# Patient Record
Sex: FEMALE | Race: OTHER | HISPANIC OR LATINO | URBAN - METROPOLITAN AREA
[De-identification: names, ages, dates, MRNs, and addresses within clinical notes are randomized per-mention and may not be internally consistent; named-entity substitution may affect disease eponyms.]

---

## 2018-01-17 PROBLEM — Z00.00 ENCOUNTER FOR PREVENTIVE HEALTH EXAMINATION: Status: ACTIVE | Noted: 2018-01-17

## 2018-01-18 ENCOUNTER — OUTPATIENT (OUTPATIENT)
Dept: OUTPATIENT SERVICES | Facility: HOSPITAL | Age: 28
LOS: 1 days | End: 2018-01-18
Payer: COMMERCIAL

## 2018-01-18 ENCOUNTER — APPOINTMENT (OUTPATIENT)
Dept: ULTRASOUND IMAGING | Facility: HOSPITAL | Age: 28
End: 2018-01-18
Payer: COMMERCIAL

## 2018-01-18 DIAGNOSIS — N64.4 MASTODYNIA: ICD-10-CM

## 2018-01-18 PROCEDURE — 76641 ULTRASOUND BREAST COMPLETE: CPT

## 2018-01-18 PROCEDURE — 76641 ULTRASOUND BREAST COMPLETE: CPT | Mod: 26

## 2018-02-07 ENCOUNTER — RESULT REVIEW (OUTPATIENT)
Age: 28
End: 2018-02-07

## 2018-02-07 ENCOUNTER — OUTPATIENT (OUTPATIENT)
Dept: OUTPATIENT SERVICES | Facility: HOSPITAL | Age: 28
LOS: 1 days | End: 2018-02-07
Payer: COMMERCIAL

## 2018-02-07 DIAGNOSIS — R92.8 OTHER ABNORMAL AND INCONCLUSIVE FINDINGS ON DIAGNOSTIC IMAGING OF BREAST: ICD-10-CM

## 2018-02-07 DIAGNOSIS — N63.0 UNSPECIFIED LUMP IN UNSPECIFIED BREAST: ICD-10-CM

## 2018-02-07 PROCEDURE — 88305 TISSUE EXAM BY PATHOLOGIST: CPT

## 2018-02-07 PROCEDURE — 88305 TISSUE EXAM BY PATHOLOGIST: CPT | Mod: 26

## 2018-02-07 PROCEDURE — 19083 BX BREAST 1ST LESION US IMAG: CPT

## 2018-02-07 PROCEDURE — 19083 BX BREAST 1ST LESION US IMAG: CPT | Mod: RT

## 2018-02-07 PROCEDURE — A4648: CPT

## 2018-07-02 ENCOUNTER — APPOINTMENT (OUTPATIENT)
Dept: OBGYN | Facility: CLINIC | Age: 28
End: 2018-07-02
Payer: COMMERCIAL

## 2018-07-02 VITALS
DIASTOLIC BLOOD PRESSURE: 80 MMHG | OXYGEN SATURATION: 98 % | SYSTOLIC BLOOD PRESSURE: 130 MMHG | HEIGHT: 64 IN | BODY MASS INDEX: 22.02 KG/M2 | HEART RATE: 62 BPM | WEIGHT: 129 LBS

## 2018-07-02 DIAGNOSIS — Z82.49 FAMILY HISTORY OF ISCHEMIC HEART DISEASE AND OTHER DISEASES OF THE CIRCULATORY SYSTEM: ICD-10-CM

## 2018-07-02 DIAGNOSIS — Z80.0 FAMILY HISTORY OF MALIGNANT NEOPLASM OF DIGESTIVE ORGANS: ICD-10-CM

## 2018-07-02 DIAGNOSIS — J45.909 UNSPECIFIED ASTHMA, UNCOMPLICATED: ICD-10-CM

## 2018-07-02 PROCEDURE — 99203 OFFICE O/P NEW LOW 30 MIN: CPT

## 2018-07-05 LAB
HIV1+2 AB SPEC QL IA.RAPID: NONREACTIVE
HPV HIGH+LOW RISK DNA PNL CVX: NOT DETECTED

## 2018-07-08 LAB
A VAGINAE DNA VAG QL NAA+PROBE: NORMAL
BVAB2 DNA VAG QL NAA+PROBE: NORMAL
C KRUSEI DNA VAG QL NAA+PROBE: NEGATIVE
C TRACH RRNA SPEC QL NAA+PROBE: NEGATIVE
CYTOLOGY CVX/VAG DOC THIN PREP: NORMAL
HBV SURFACE AG SER QL: NONREACTIVE
HSV 1+2 IGG SER IA-IMP: NEGATIVE
HSV 1+2 IGG SER IA-IMP: POSITIVE
HSV1 IGG SER QL: 38.3 INDEX
HSV2 IGG SER QL: 0.12 INDEX
MEGA1 DNA VAG QL NAA+PROBE: NORMAL
N GONORRHOEA RRNA SPEC QL NAA+PROBE: NEGATIVE
T PALLIDUM AB SER QL IA: NEGATIVE
T VAGINALIS RRNA SPEC QL NAA+PROBE: NEGATIVE

## 2018-08-06 ENCOUNTER — APPOINTMENT (OUTPATIENT)
Dept: OBGYN | Facility: CLINIC | Age: 28
End: 2018-08-06
Payer: COMMERCIAL

## 2018-08-06 VITALS
SYSTOLIC BLOOD PRESSURE: 100 MMHG | HEART RATE: 95 BPM | BODY MASS INDEX: 22.2 KG/M2 | OXYGEN SATURATION: 98 % | DIASTOLIC BLOOD PRESSURE: 70 MMHG | WEIGHT: 130 LBS | HEIGHT: 64 IN

## 2018-08-06 DIAGNOSIS — Z86.018 PERSONAL HISTORY OF OTHER BENIGN NEOPLASM: ICD-10-CM

## 2018-08-06 PROCEDURE — 99213 OFFICE O/P EST LOW 20 MIN: CPT

## 2019-03-21 ENCOUNTER — APPOINTMENT (OUTPATIENT)
Dept: OBGYN | Facility: CLINIC | Age: 29
End: 2019-03-21
Payer: COMMERCIAL

## 2019-03-21 VITALS
OXYGEN SATURATION: 98 % | BODY MASS INDEX: 23.86 KG/M2 | DIASTOLIC BLOOD PRESSURE: 80 MMHG | RESPIRATION RATE: 16 BRPM | WEIGHT: 139 LBS | SYSTOLIC BLOOD PRESSURE: 118 MMHG | HEART RATE: 66 BPM

## 2019-03-21 DIAGNOSIS — R82.90 UNSPECIFIED ABNORMAL FINDINGS IN URINE: ICD-10-CM

## 2019-03-21 PROCEDURE — 99213 OFFICE O/P EST LOW 20 MIN: CPT

## 2019-03-21 NOTE — PHYSICAL EXAM
[Mass] : no breast mass [Nipple Discharge] : no nipple discharge [Axillary LAD] : no axillary lymphadenopathy [Examination Of The Breasts] : a normal appearance [No Discharge] : no discharge [Tenderness Of The Right Breast] : tenderness [Tenderness Of The Left Breast] : no tenderness [No Masses] : no breast masses were palpable [Axillary Lymph Nodes Enlarged Bilaterally] : no enlarged nodes [Tender] : non tender [Distended] : not distended [H/Smegaly] : no hepatosplenomegaly [Oriented x3] : oriented to person, place, and time [Depressed Mood] : not depressed [Flat Affect] : affect not flat [No Lesions] : no genitalia lesions [Vulvar Atrophy] : no vulvar atrophy [Vulvitis] : no vulvitis [Labia Majora Erythema] : no erythema of the labia majora [Labia Minora Erythema] : no erythema of the labia minora [Labia Majora] : labia major [Labia Minora] : labia minora [Normal] : clitoris [Scant] : scant [Brown] : brown [No Bleeding] : there was no active vaginal bleeding [Discharge] : had no discharge [Erosion] : had no erosions [Motion Tenderness] : there was no cervical motion tenderness [Soft] :  the cervix was soft [Normal Position] : in a normal position [Tenderness] : nontender [Enlarged ___ wks] : not enlarged [Mass ___ cm] : no uterine mass was palpated [Uterine Adnexae] : were not tender and not enlarged [Adnexa Tenderness] : were not tender [Ovarian Mass (___ Cm)] : there were no adnexal masses

## 2019-03-21 NOTE — CHIEF COMPLAINT
[Urgent Visit] : Urgent Visit [FreeTextEntry1] : Urine odor for past 6 months LMP 3/16/19 Not sexually active Denies vaginal Sx

## 2019-03-24 LAB
BACTERIA UR CULT: NORMAL
BILIRUB UR QL STRIP: NORMAL
CANDIDA VAG CYTO: NOT DETECTED
CLARITY UR: CLEAR
COLLECTION METHOD: NORMAL
G VAGINALIS+PREV SP MTYP VAG QL MICRO: NOT DETECTED
GLUCOSE UR-MCNC: NORMAL
HCG UR QL: 0.2 EU/DL
HGB UR QL STRIP.AUTO: NORMAL
KETONES UR-MCNC: NORMAL
LEUKOCYTE ESTERASE UR QL STRIP: NORMAL
NITRITE UR QL STRIP: NORMAL
PH UR STRIP: 7.5
PROT UR STRIP-MCNC: NORMAL
SP GR UR STRIP: 1.01
T VAGINALIS VAG QL WET PREP: NOT DETECTED

## 2019-04-04 ENCOUNTER — TRANSCRIPTION ENCOUNTER (OUTPATIENT)
Age: 29
End: 2019-04-04

## 2019-04-29 ENCOUNTER — FORM ENCOUNTER (OUTPATIENT)
Age: 29
End: 2019-04-29

## 2019-04-30 ENCOUNTER — OUTPATIENT (OUTPATIENT)
Dept: OUTPATIENT SERVICES | Facility: HOSPITAL | Age: 29
LOS: 1 days | End: 2019-04-30
Payer: COMMERCIAL

## 2019-04-30 ENCOUNTER — APPOINTMENT (OUTPATIENT)
Dept: ULTRASOUND IMAGING | Facility: HOSPITAL | Age: 29
End: 2019-04-30

## 2019-04-30 DIAGNOSIS — Z00.8 ENCOUNTER FOR OTHER GENERAL EXAMINATION: ICD-10-CM

## 2019-04-30 PROCEDURE — 76641 ULTRASOUND BREAST COMPLETE: CPT

## 2019-04-30 PROCEDURE — 76641 ULTRASOUND BREAST COMPLETE: CPT | Mod: 26

## 2020-08-07 ENCOUNTER — OUTPATIENT (OUTPATIENT)
Dept: OUTPATIENT SERVICES | Facility: HOSPITAL | Age: 30
LOS: 1 days | End: 2020-08-07
Payer: COMMERCIAL

## 2020-08-07 ENCOUNTER — APPOINTMENT (OUTPATIENT)
Dept: ULTRASOUND IMAGING | Facility: HOSPITAL | Age: 30
End: 2020-08-07
Payer: COMMERCIAL

## 2020-08-07 DIAGNOSIS — Z00.8 ENCOUNTER FOR OTHER GENERAL EXAMINATION: ICD-10-CM

## 2020-08-07 PROCEDURE — 76536 US EXAM OF HEAD AND NECK: CPT | Mod: 26

## 2020-08-07 PROCEDURE — 76536 US EXAM OF HEAD AND NECK: CPT

## 2020-08-15 ENCOUNTER — APPOINTMENT (OUTPATIENT)
Dept: ULTRASOUND IMAGING | Facility: HOSPITAL | Age: 30
End: 2020-08-15
Payer: COMMERCIAL

## 2020-08-15 ENCOUNTER — OUTPATIENT (OUTPATIENT)
Dept: OUTPATIENT SERVICES | Facility: HOSPITAL | Age: 30
LOS: 1 days | End: 2020-08-15
Payer: COMMERCIAL

## 2020-08-15 DIAGNOSIS — Z00.8 ENCOUNTER FOR OTHER GENERAL EXAMINATION: ICD-10-CM

## 2020-08-15 PROCEDURE — 76700 US EXAM ABDOM COMPLETE: CPT

## 2020-08-15 PROCEDURE — 76700 US EXAM ABDOM COMPLETE: CPT | Mod: 26

## 2020-08-19 ENCOUNTER — APPOINTMENT (OUTPATIENT)
Dept: ULTRASOUND IMAGING | Facility: HOSPITAL | Age: 30
End: 2020-08-19

## 2020-09-03 ENCOUNTER — OUTPATIENT (OUTPATIENT)
Dept: OUTPATIENT SERVICES | Facility: HOSPITAL | Age: 30
LOS: 1 days | End: 2020-09-03
Payer: COMMERCIAL

## 2020-09-03 ENCOUNTER — APPOINTMENT (OUTPATIENT)
Dept: ULTRASOUND IMAGING | Facility: HOSPITAL | Age: 30
End: 2020-09-03
Payer: COMMERCIAL

## 2020-09-03 DIAGNOSIS — Z00.8 ENCOUNTER FOR OTHER GENERAL EXAMINATION: ICD-10-CM

## 2020-09-03 PROCEDURE — 76641 ULTRASOUND BREAST COMPLETE: CPT | Mod: 26,50

## 2020-09-03 PROCEDURE — 76641 ULTRASOUND BREAST COMPLETE: CPT

## 2020-12-02 ENCOUNTER — NON-APPOINTMENT (OUTPATIENT)
Age: 30
End: 2020-12-02

## 2021-01-28 ENCOUNTER — APPOINTMENT (OUTPATIENT)
Dept: OBGYN | Facility: CLINIC | Age: 31
End: 2021-01-28
Payer: COMMERCIAL

## 2021-01-28 VITALS
OXYGEN SATURATION: 98 % | SYSTOLIC BLOOD PRESSURE: 120 MMHG | TEMPERATURE: 97 F | HEART RATE: 65 BPM | HEIGHT: 64 IN | DIASTOLIC BLOOD PRESSURE: 70 MMHG | BODY MASS INDEX: 22.88 KG/M2 | WEIGHT: 134 LBS

## 2021-01-28 DIAGNOSIS — N63.20 UNSPECIFIED LUMP IN THE LEFT BREAST, UNSPECIFIED QUADRANT: ICD-10-CM

## 2021-01-28 DIAGNOSIS — N64.4 MASTODYNIA: ICD-10-CM

## 2021-01-28 PROCEDURE — 99072 ADDL SUPL MATRL&STAF TM PHE: CPT

## 2021-01-28 PROCEDURE — 99395 PREV VISIT EST AGE 18-39: CPT

## 2021-01-28 PROCEDURE — 99212 OFFICE O/P EST SF 10 MIN: CPT | Mod: 25

## 2021-01-28 NOTE — PHYSICAL EXAM
[Normal] : normal [Examination Of The Breasts] : a normal appearance [___cm] : a ~M [unfilled] ~Ucm superior lateral quadrant mass was palpated [Soft] : soft [Mobile] : mobile [Tender] : tender [1:00] : in the 1:00 position

## 2021-01-28 NOTE — COUNSELING
[Nutrition/ Exercise/ Weight Management] : nutrition, exercise, weight management [Breast Self Exam] : breast self exam [Body Image] : body image

## 2021-01-29 NOTE — HISTORY OF PRESENT ILLNESS
[Regular Cycle Intervals] : periods have been regular [Previously active] : previously active [PGHxTotal] : 0 [FreeTextEntry1] : 1/18/21

## 2021-01-31 LAB — HPV HIGH+LOW RISK DNA PNL CVX: NOT DETECTED

## 2021-02-06 LAB — CYTOLOGY CVX/VAG DOC THIN PREP: NORMAL

## 2021-03-03 ENCOUNTER — RESULT REVIEW (OUTPATIENT)
Age: 31
End: 2021-03-03

## 2021-03-03 ENCOUNTER — NON-APPOINTMENT (OUTPATIENT)
Age: 31
End: 2021-03-03

## 2021-03-03 ENCOUNTER — APPOINTMENT (OUTPATIENT)
Dept: ULTRASOUND IMAGING | Facility: HOSPITAL | Age: 31
End: 2021-03-03
Payer: COMMERCIAL

## 2021-03-03 ENCOUNTER — OUTPATIENT (OUTPATIENT)
Dept: OUTPATIENT SERVICES | Facility: HOSPITAL | Age: 31
LOS: 1 days | End: 2021-03-03
Payer: COMMERCIAL

## 2021-03-03 DIAGNOSIS — Z00.8 ENCOUNTER FOR OTHER GENERAL EXAMINATION: ICD-10-CM

## 2021-03-03 DIAGNOSIS — N64.4 MASTODYNIA: ICD-10-CM

## 2021-03-03 PROCEDURE — 76642 ULTRASOUND BREAST LIMITED: CPT

## 2021-03-03 PROCEDURE — 76642 ULTRASOUND BREAST LIMITED: CPT | Mod: 26,RT

## 2021-03-19 ENCOUNTER — TRANSCRIPTION ENCOUNTER (OUTPATIENT)
Age: 31
End: 2021-03-19

## 2021-05-20 ENCOUNTER — APPOINTMENT (OUTPATIENT)
Dept: OBGYN | Facility: CLINIC | Age: 31
End: 2021-05-20
Payer: COMMERCIAL

## 2021-05-20 VITALS
HEART RATE: 55 BPM | DIASTOLIC BLOOD PRESSURE: 70 MMHG | SYSTOLIC BLOOD PRESSURE: 120 MMHG | BODY MASS INDEX: 22.88 KG/M2 | WEIGHT: 134 LBS | OXYGEN SATURATION: 98 % | HEIGHT: 64 IN | TEMPERATURE: 97.3 F

## 2021-05-20 DIAGNOSIS — N63.10 UNSPECIFIED LUMP IN THE RIGHT BREAST, UNSPECIFIED QUADRANT: ICD-10-CM

## 2021-05-20 DIAGNOSIS — R92.8 OTHER ABNORMAL AND INCONCLUSIVE FINDINGS ON DIAGNOSTIC IMAGING OF BREAST: ICD-10-CM

## 2021-05-20 PROCEDURE — 99213 OFFICE O/P EST LOW 20 MIN: CPT

## 2021-05-20 PROCEDURE — 99072 ADDL SUPL MATRL&STAF TM PHE: CPT

## 2021-07-06 ENCOUNTER — TRANSCRIPTION ENCOUNTER (OUTPATIENT)
Age: 31
End: 2021-07-06

## 2022-06-02 ENCOUNTER — APPOINTMENT (OUTPATIENT)
Dept: OBGYN | Facility: CLINIC | Age: 32
End: 2022-06-02
Payer: COMMERCIAL

## 2022-06-02 VITALS
BODY MASS INDEX: 22.88 KG/M2 | HEIGHT: 64 IN | OXYGEN SATURATION: 98 % | SYSTOLIC BLOOD PRESSURE: 100 MMHG | HEART RATE: 70 BPM | WEIGHT: 134 LBS | DIASTOLIC BLOOD PRESSURE: 70 MMHG

## 2022-06-02 DIAGNOSIS — Z01.419 ENCOUNTER FOR GYNECOLOGICAL EXAMINATION (GENERAL) (ROUTINE) W/OUT ABNORMAL FINDINGS: ICD-10-CM

## 2022-06-02 DIAGNOSIS — N92.0 EXCESSIVE AND FREQUENT MENSTRUATION WITH REGULAR CYCLE: ICD-10-CM

## 2022-06-02 DIAGNOSIS — Z11.3 ENCOUNTER FOR SCREENING FOR INFECTIONS WITH A PREDOMINANTLY SEXUAL MODE OF TRANSMISSION: ICD-10-CM

## 2022-06-02 PROCEDURE — 99395 PREV VISIT EST AGE 18-39: CPT

## 2022-06-02 NOTE — PHYSICAL EXAM
[Chaperone Present] : A chaperone was present in the examining room during all aspects of the physical examination [FreeTextEntry1] : ENRIQUETA Gunter [Appropriately responsive] : appropriately responsive [Alert] : alert [No Acute Distress] : no acute distress [No Lymphadenopathy] : no lymphadenopathy [Non-tender] : non-tender [Non-distended] : non-distended [No HSM] : No HSM [No Lesions] : no lesions [No Mass] : no mass [Oriented x3] : oriented x3 [Examination Of The Breasts] : a normal appearance [No Discharge] : no discharge [No Masses] : no breast masses were palpable [Labia Majora] : normal [Labia Minora] : normal [No Bleeding] : There was no active vaginal bleeding [Soft] : soft [Normal] : normal [Normal Position] : in a normal position [Tenderness] : nontender [Enlarged ___ wks] : not enlarged [Mass ___ cm] : no uterine mass was palpated [Uterine Adnexae] : normal

## 2022-06-02 NOTE — COUNSELING
[Nutrition/ Exercise/ Weight Management] : nutrition, exercise, weight management [Vitamins/Supplements] : vitamins/supplements [Breast Self Exam] : breast self exam [Preconception Care/ Fertility options] : preconception care, fertility options [FreeTextEntry2] : Menstrual diary

## 2022-06-02 NOTE — HISTORY OF PRESENT ILLNESS
[FreeTextEntry1] : Check up  Had 2 menses in May each last 5 days Usually has regular menses Menses in April was late by one week Got  3/10/22 Did home HCG last Monday  Results negative denies postcoital bleeding\par \par Denies COVID infection  Denies COVID vaccine [N] : Patient does not use contraception [Y] : Patient is sexually active [Monogamous (Male Partner)] : is monogamous with a male partner [LMPDate] : 5/26/22 [PGHxTotal] : 0

## 2022-06-02 NOTE — REVIEW OF SYSTEMS
[Dysuria] : dysuria [Abn Vaginal bleeding] : abnormal vaginal bleeding [Genital Rash/Irritation] : genital rash/irritation [Negative] : Genitourinary

## 2022-06-09 DIAGNOSIS — B96.89 ACUTE VAGINITIS: ICD-10-CM

## 2022-06-09 DIAGNOSIS — N76.0 ACUTE VAGINITIS: ICD-10-CM

## 2022-06-09 LAB
C TRACH RRNA SPEC QL NAA+PROBE: NOT DETECTED
CANDIDA VAG CYTO: NOT DETECTED
CYTOLOGY CVX/VAG DOC THIN PREP: NORMAL
G VAGINALIS+PREV SP MTYP VAG QL MICRO: DETECTED
HCG UR QL: NEGATIVE
HPV HIGH+LOW RISK DNA PNL CVX: NOT DETECTED
N GONORRHOEA RRNA SPEC QL NAA+PROBE: NOT DETECTED
QUALITY CONTROL: YES
SOURCE AMPLIFICATION: NORMAL
T VAGINALIS VAG QL WET PREP: NOT DETECTED

## 2022-07-05 LAB
HBV SURFACE AG SER QL: NONREACTIVE
HIV1+2 AB SPEC QL IA.RAPID: NONREACTIVE
HSV 1+2 IGG SER IA-IMP: NEGATIVE
HSV 1+2 IGG SER IA-IMP: POSITIVE
HSV1 IGG SER QL: 38.2 INDEX
HSV2 IGG SER QL: 0.11 INDEX
T PALLIDUM AB SER QL IA: NEGATIVE

## 2022-11-23 ENCOUNTER — NON-APPOINTMENT (OUTPATIENT)
Age: 32
End: 2022-11-23

## 2022-12-08 ENCOUNTER — APPOINTMENT (OUTPATIENT)
Dept: OBGYN | Facility: CLINIC | Age: 32
End: 2022-12-08

## 2023-01-18 ENCOUNTER — APPOINTMENT (OUTPATIENT)
Dept: ANTEPARTUM | Facility: CLINIC | Age: 33
End: 2023-01-18
Payer: COMMERCIAL

## 2023-01-18 ENCOUNTER — ASOB RESULT (OUTPATIENT)
Age: 33
End: 2023-01-18

## 2023-01-18 DIAGNOSIS — O35.1XX0 MATERNAL CARE FOR (SUSPECTED) CHROMOSOMAL ABNORMALITY IN FETUS, NOT APPLICABLE OR UNSPECIFIED: ICD-10-CM

## 2023-01-18 PROCEDURE — 76813 OB US NUCHAL MEAS 1 GEST: CPT

## 2023-01-18 PROCEDURE — 36415 COLL VENOUS BLD VENIPUNCTURE: CPT

## 2023-01-24 LAB
ADDITIONAL US: NORMAL
COMMENTS: AFP: NORMAL
CRL SCAN TWIN B: NORMAL
CRL SCAN: NORMAL
CROWN RUMP LENGTH TWIN B: NORMAL
CROWN RUMP LENGTH: 66.8 MM
DOWN SYNDROME AGE RISK: NORMAL
DOWN SYNDROME INTERPRETATION: NORMAL
DOWN SYNDROME SCREENING RISK: NORMAL
GEST. AGE ON COLLECTION DATE: 12.9 WEEKS
HCG MOM: 0.76
HCG VALUE: 77.5 IU/ML
MATERNAL AGE AT EDD: 33.6 YR
NOTE: AFP: NORMAL
NT MOM TWIN B: NORMAL
NT TWIN B: NORMAL
NUCHAL TRANSLUCENCY (NT): 1.5 MM
NUCHAL TRANSLUCENCY MOM: 1.11
NUMBER OF FETUSES: 1
PAPP-A MOM: 0.65
PAPP-A VALUE: 818.9 NG/ML
RACE: NORMAL
RESULTS AFP: NORMAL
SONOGRAPHER ID#: NORMAL
SUBMIT PART 2 SAMPLE USING: NORMAL
TEST RESULTS: AFP: NORMAL
TRISOMY 18 AGE RISK: NORMAL
TRISOMY 18 INTERPRETATION: NORMAL
TRISOMY 18 SCREENING RISK: NORMAL
WEIGHT AFP: 133 LBS

## 2023-02-15 ENCOUNTER — APPOINTMENT (OUTPATIENT)
Dept: ANTEPARTUM | Facility: CLINIC | Age: 33
End: 2023-02-15
Payer: COMMERCIAL

## 2023-02-15 PROCEDURE — 36415 COLL VENOUS BLD VENIPUNCTURE: CPT

## 2023-02-21 LAB
ADDITIONAL US: NORMAL
AFP MOM: 0.91
AFP VALUE: 35.3 NG/ML
COLLECTED ON 2: NORMAL
COLLECTED ON: NORMAL
CRL SCAN TWIN B: NORMAL
CRL SCAN: NORMAL
CROWN RUMP LENGTH TWIN B: NORMAL
CROWN RUMP LENGTH: 66.8 MM
DIA MOM: 0.88
DIA VALUE: 142.7 PG/ML
DOWN SYNDROME AGE RISK: NORMAL
DOWN SYNDROME INTERPRETATION: NORMAL
DOWN SYNDROME SCREENING RISK: NORMAL
FIRST TRIMESTER SAMPLE: NORMAL
GEST. AGE ON COLLECTION DATE: 12.9 WEEKS
GESTATIONAL AGE: 16.9 WEEKS
HCG MOM: 1.14
HCG VALUE: 38.7 IU/ML
INSULIN DEP DIABETES: NO
MATERNAL AGE AT EDD: 33.6 YR
NT MOM TWIN B: NORMAL
NT TWIN B: NORMAL
NUCHAL TRANSLUCENCY (NT): 1.5 MM
NUCHAL TRANSLUCENCY MOM: 1.11
NUMBER OF FETUSES: 1
OPEN SPINA BIFIDA: NORMAL
OSB INTERPRETATION: NORMAL
PAPP-A MOM: 0.65
PAPP-A VALUE: 818.9 NG/ML
RACE: NORMAL
SECOND TRIMESTER SAMPLE: NORMAL
SEQUENTIAL 2 COMMENTS: NORMAL
SEQUENTIAL 2 NOTE: NORMAL
SEQUENTIAL 2 RESULTS: NORMAL
SEQUENTIAL 2 TEST RESULTS: NORMAL
SONOGRAPHER ID#: NORMAL
TRISOMY 18 AGE RISK: NORMAL
TRISOMY 18 INTERPRETATION: NORMAL
TRISOMY 18 SCREENING RISK: NORMAL
UE3 MOM: 0.76
UE3 VALUE: 0.89 NG/ML
WEIGHT AFP: 133 LBS
WEIGHT: 139 LBS

## 2023-02-24 ENCOUNTER — NON-APPOINTMENT (OUTPATIENT)
Age: 33
End: 2023-02-24

## 2023-03-15 ENCOUNTER — APPOINTMENT (OUTPATIENT)
Dept: ANTEPARTUM | Facility: CLINIC | Age: 33
End: 2023-03-15
Payer: COMMERCIAL

## 2023-03-15 ENCOUNTER — ASOB RESULT (OUTPATIENT)
Age: 33
End: 2023-03-15

## 2023-03-15 PROCEDURE — 76817 TRANSVAGINAL US OBSTETRIC: CPT | Mod: 59

## 2023-03-15 PROCEDURE — 76811 OB US DETAILED SNGL FETUS: CPT

## 2023-03-15 PROCEDURE — 99212 OFFICE O/P EST SF 10 MIN: CPT | Mod: 24,25

## 2023-03-22 ENCOUNTER — OUTPATIENT (OUTPATIENT)
Dept: OUTPATIENT SERVICES | Age: 33
LOS: 1 days | End: 2023-03-22

## 2023-03-22 ENCOUNTER — APPOINTMENT (OUTPATIENT)
Dept: MRI IMAGING | Facility: HOSPITAL | Age: 33
End: 2023-03-22
Payer: COMMERCIAL

## 2023-03-22 DIAGNOSIS — Q79.0 CONGENITAL DIAPHRAGMATIC HERNIA: ICD-10-CM

## 2023-03-23 ENCOUNTER — APPOINTMENT (OUTPATIENT)
Dept: MATERNAL FETAL MEDICINE | Facility: CLINIC | Age: 33
End: 2023-03-23
Payer: COMMERCIAL

## 2023-03-23 ENCOUNTER — ASOB RESULT (OUTPATIENT)
Age: 33
End: 2023-03-23

## 2023-03-23 ENCOUNTER — APPOINTMENT (OUTPATIENT)
Dept: PEDIATRIC CARDIOLOGY | Facility: CLINIC | Age: 33
End: 2023-03-23
Payer: COMMERCIAL

## 2023-03-23 DIAGNOSIS — Z3A.21 21 WEEKS GESTATION OF PREGNANCY: ICD-10-CM

## 2023-03-23 PROCEDURE — 74712 MRI FETAL SNGL/1ST GESTATION: CPT | Mod: 26

## 2023-03-23 PROCEDURE — 76827 ECHO EXAM OF FETAL HEART: CPT

## 2023-03-23 PROCEDURE — 76821 MIDDLE CEREBRAL ARTERY ECHO: CPT

## 2023-03-23 PROCEDURE — 99212 OFFICE O/P EST SF 10 MIN: CPT | Mod: 95

## 2023-03-23 PROCEDURE — 99204 OFFICE O/P NEW MOD 45 MIN: CPT | Mod: 25

## 2023-03-23 PROCEDURE — 93325 DOPPLER ECHO COLOR FLOW MAPG: CPT | Mod: 59

## 2023-03-23 PROCEDURE — 76820 UMBILICAL ARTERY ECHO: CPT

## 2023-03-23 PROCEDURE — 99202 OFFICE O/P NEW SF 15 MIN: CPT | Mod: 95

## 2023-03-23 PROCEDURE — 76825 ECHO EXAM OF FETAL HEART: CPT

## 2023-03-27 ENCOUNTER — NON-APPOINTMENT (OUTPATIENT)
Age: 33
End: 2023-03-27

## 2023-04-03 ENCOUNTER — NON-APPOINTMENT (OUTPATIENT)
Age: 33
End: 2023-04-03

## 2023-04-18 ENCOUNTER — ASOB RESULT (OUTPATIENT)
Age: 33
End: 2023-04-18

## 2023-04-18 ENCOUNTER — APPOINTMENT (OUTPATIENT)
Dept: ANTEPARTUM | Facility: CLINIC | Age: 33
End: 2023-04-18
Payer: COMMERCIAL

## 2023-04-18 PROCEDURE — 76816 OB US FOLLOW-UP PER FETUS: CPT

## 2023-04-19 ENCOUNTER — APPOINTMENT (OUTPATIENT)
Dept: PEDIATRIC SURGERY | Facility: CLINIC | Age: 33
End: 2023-04-19
Payer: COMMERCIAL

## 2023-04-19 PROCEDURE — 99205 OFFICE O/P NEW HI 60 MIN: CPT | Mod: 95

## 2023-04-21 ENCOUNTER — APPOINTMENT (OUTPATIENT)
Dept: PEDIATRIC CARDIOLOGY | Facility: CLINIC | Age: 33
End: 2023-04-21

## 2023-04-23 NOTE — ASSESSMENT
[FreeTextEntry1] : I had the privilege of meeting with Ms. Gregg today.   She is currently pregnant with a 25 week gestational age fetus, with recently diagnosed with a left sided diaphragmatic abnormality. This was first visualized on prenatal US at 20 weeks when a diaphragmatic hernia was visualized with stomach in the left chest.  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   She had an echocardiogram that was normal without any structural defects noted.   I reviewed these findings today with Ms. Gregg.  \par \par I had a long discussion today with Ms. Gregg, educating her about the differential diagnosis of diaphragmatic abnormalities in the fetus. I explained that the purpose of the visit was to provide her with information about the baby’s condition and prognosis and what she may anticipate for the future. I explained the nature of what an eventration versus CDH is  I spent time discussing the differences between the two. I reviewed with her that often times a CDH may have a sac and it could be difficult to ascertain the difference in utero or even after birth. However, I discussed that it is the effects on lung development, mainly pulmonary hypoplasia and pulmonary hypertension, that will be important in the immediate  period.\par \par I explained that these effects on the heart and lungs are variable, and therefore the prognosis is also variable.  With regards to an eventration, I discussed with her that the possibility of surgical intervention will depend on the baby's symptoms.  If the baby is completely asymptomatic, and a diagnosis of eventration is confirmed, mom understands that surgical intervention may not be warranted.  Should the baby have an eventration; however, have respiratory symptoms, than repair would be recommended.  I discussed this is typically performed via thoracoscopy with an eventration of the diaphragm but with this may require an open procedure should the baby have significant symptoms. \par \par I spent the majority of the time discussing the possibility of a CDH.  I reviewed in some detail the care that these babies receive after birth, and the different modalities that can be utilized to help support the baby after birth, including a breathing tube, special ventilators, NO and ECMO, and the possible complications of the use of these modalities. I also discussed the surgical repair, including both the thoracoscopic and open techniques, including the associated risks. I discussed what we know about the long term outcomes are for babies born with both an eventration or a CDH.  Mom understands that at times, a certain diagnosis can only be made at the time of surgical intervention given the potential difficulties in diagnosis between these two entities.  \par \par Finally, I reviewed what criteria we have available to help predict the baby’s prognosis.  I reviewed with Ms Gregg that the current o/e ratio is  quite reassuring, but she understands this isn't a perfect predictors and we will continue to monitor these numbers and prognostic factors together moving forward. She understands that after the baby's birth, we will have a better understanding of the baby's diagnosis, prognosis, and need for surgery. I reviewed with her that in severe cases, ECMO is a possibility, but in the most severe, ECMO may not be offered and there may be nothing to offer to their baby. She understands that surgery is typically deferred until the baby has improved from a cardiorespiratory standpoint and again in the most severe cases, is not offered.   She understand that the timing of the procedure as well as the postoperative course will depend upon the degree of pulmonary hypoplasia and pulmonary hypertension.\par \par Ms Gregg demonstrates understanding of all that we have discussed. I will continue to monitor the progress throughout the pregnancy. I have answered all of her questions to the best of my ability. She knows to contact me with any further questions or concerns.\par

## 2023-04-23 NOTE — REASON FOR VISIT
[New Patient Prenatal Visit] : a new patient prenatal visit [Patient] : patient [[employer] ] : provided by [employer]   [FreeTextEntry4] : Dr Nicola Cooper [Interpreters_IDNumber] : [id number] [TWNoteComboBox1] : Ukrainian

## 2023-04-23 NOTE — CONSULT LETTER
[Dear  ___] : Dear  [unfilled], [Consult Letter:] : I had the pleasure of evaluating your patient, [unfilled]. [Please see my note below.] : Please see my note below. [Consult Closing:] : Thank you very much for allowing me to participate in the care of this patient.  If you have any questions, please do not hesitate to contact me. [Sincerely,] : Sincerely, [FreeTextEntry2] : Dr Nicola Cooper\par [address] [place] [address], [address]\[place] [address]\par  [FreeTextEntry3] : Jesus Dent MD\par Division of Pediatric, General, Thoracic and Endoscopic Surgery\par [facility]

## 2023-04-23 NOTE — HISTORY OF PRESENT ILLNESS
[Home] : at home, [unfilled] , at the time of the visit. [Medical Office: ([facility])___] : at the medical office located in  [Verbal consent obtained from patient] : the patient, [unfilled] [FreeTextEntry1] : Ms Gregg is a 32 year old female carrying a 25 week gestational age fetus found to have a likely left congenital  diaphragmatic hernia on prenatal imaging.  She is here today via [facility] to further discuss these findings and the plan of care for her baby after birth.

## 2023-04-25 ENCOUNTER — NON-APPOINTMENT (OUTPATIENT)
Age: 33
End: 2023-04-25

## 2023-05-02 ENCOUNTER — NON-APPOINTMENT (OUTPATIENT)
Age: 33
End: 2023-05-02

## 2023-05-04 ENCOUNTER — NON-APPOINTMENT (OUTPATIENT)
Age: 33
End: 2023-05-04

## 2023-05-12 ENCOUNTER — APPOINTMENT (OUTPATIENT)
Dept: PEDIATRIC CARDIOLOGY | Facility: CLINIC | Age: 33
End: 2023-05-12
Payer: COMMERCIAL

## 2023-05-12 DIAGNOSIS — Z3A.28 28 WEEKS GESTATION OF PREGNANCY: ICD-10-CM

## 2023-05-12 DIAGNOSIS — O35.9XX0 MATERNAL CARE FOR (SUSPECTED) FETAL ABNORMALITY AND DAMAGE, UNSPECIFIED, NOT APPLICABLE OR UNSPECIFIED: ICD-10-CM

## 2023-05-12 PROCEDURE — 76826 ECHO EXAM OF FETAL HEART: CPT

## 2023-05-12 PROCEDURE — 76821 MIDDLE CEREBRAL ARTERY ECHO: CPT

## 2023-05-12 PROCEDURE — 99214 OFFICE O/P EST MOD 30 MIN: CPT | Mod: 25

## 2023-05-12 PROCEDURE — 76820 UMBILICAL ARTERY ECHO: CPT

## 2023-05-12 PROCEDURE — 93325 DOPPLER ECHO COLOR FLOW MAPG: CPT | Mod: 59

## 2023-05-12 PROCEDURE — 76828 ECHO EXAM OF FETAL HEART: CPT

## 2023-05-17 ENCOUNTER — ASOB RESULT (OUTPATIENT)
Age: 33
End: 2023-05-17

## 2023-05-17 ENCOUNTER — APPOINTMENT (OUTPATIENT)
Dept: ANTEPARTUM | Facility: CLINIC | Age: 33
End: 2023-05-17
Payer: COMMERCIAL

## 2023-05-17 ENCOUNTER — APPOINTMENT (OUTPATIENT)
Dept: ANTEPARTUM | Facility: CLINIC | Age: 33
End: 2023-05-17

## 2023-05-17 PROCEDURE — 76816 OB US FOLLOW-UP PER FETUS: CPT

## 2023-05-22 ENCOUNTER — APPOINTMENT (OUTPATIENT)
Dept: OTHER | Facility: CLINIC | Age: 33
End: 2023-05-22
Payer: COMMERCIAL

## 2023-05-22 DIAGNOSIS — Q79.0 CONGENITAL DIAPHRAGMATIC HERNIA: ICD-10-CM

## 2023-05-22 PROCEDURE — 99205 OFFICE O/P NEW HI 60 MIN: CPT | Mod: 95

## 2023-05-22 NOTE — ASSESSMENT
[FreeTextEntry1] : Referral Subject: Left sided congenital diaphragmatic hernia\par \par Pregnancy Hx: 32 year old G1 mother currently 30 weeks gestation. Prenatal history significant for a left sided congenital diaphragmatic hernia. Finding was diagnosed at 20 weeks when a diaphragmatic abnormality was noted with stomach in the left chest. Fetal MRI findings c/w left CDH with a sac vs. eventration of the left hemidiaphragm; elevation of the left lobe of the liver, stomach and spleen into the left hemithorax. O:E ratio 0.7. Fetal ECHO was normal with no structural defects noted. ELVI 7/29/23.  \par \par Medical/Surgical Hx:  None reported\par Family Hx:  N/A\par Prior OB: N/A \par \par Counseling and consultation description:  \par This consult was conducted via Telehealth ([employer] Teams Solo) using real-time 2 way audio visual technology. The patient, Araseli Gregg, was located at home at the time of the visit. The provider, Dr. Sonja Rodas, was located at her home office at the time of the visit. The patient was located at home. Verbal consent for Telehealth services was given prior to the visit by the patient, Araseli Gregg. \par \par The goal of the meeting was to discuss the recent diagnosis of a left sided congenital diaphragmatic hernia (CDH) as well as the possible outcomes after delivery. Mother has previously been consulted Pediatric Surgery (Dr. Jesus Dent).  \par \par We reviewed the consequences of CDH and the significant effects it has on fetal lung development. I also explained the concepts of pulmonary hypoplasia, pulmonary hypertension, and decreased ventricular function. In severe cases, pulmonary hypoplasia is so severe that it is incompatible with life. \par \par We discussed that a team of physicians will be present at the time of birth. We discussed the possibility of respiratory distress shortly after birth and the need for respiratory support (intubation and mechanical ventilation) in the delivery room. The baby will be admitted to NICU for monitoring, observation, and management.  The baby’s vital signs will be continuously monitored.  Any respiratory and/or cardiovascular support will be provided as needed. \par \par I reviewed in detail the possible modalities that can be utilized to help support the infant after birth, including continued mechanical ventilation, pulmonary vasodilators (nitric oxide), vasopressors, and, in severe cases, ECMO. The risk of ECMO were discussed, including hemorrhage, infection, and stroke. The infant will also be carefully examined at birth, and additional radiologic studies will be performed to rule out anomalies if indicated. Further genetic work-up may be done if there are associated anomalies.\par \par We discussed the need for surgical repair when the baby has been stabilized. We discussed potential complications after repair, including respiratory infections, chylothorax, chronic lung disease, recurrent hernia, gastrointestinal/feeding issues, intestinal obstruction, failure to thrive, and neurodevelopmental impairment. We discussed  the need for various vascular catheters (umbilical lines, PICC lines), cardiorespiratory monitoring, saturation monitoring, and feeding tubes and their role in monitoring and maintaining health patterns. \par \par We discussed the importance of all nutrition with an emphasis on breast milk as an important nutritional and health promoting food source. She should pump immediately after delivery, and provide colostrum and milk as possible. Lactation consultants will advise on breastfeeding techniques.  \par \par We discussed the extensive capabilities of our hospital in terms of personnel and equipment.  We also discussed visiting hours/policies. Ms. Gregg verbalized an understanding of the topics and all questions were answered.  \par \par Assessment: \par 1.	Currently 30 weeks gestation\par 2.	Prenatal diagnosis of left sided CDH\par \par Plan:  \par 1.	NICU team present at birth with respiratory support/intervention as needed\par 2.	Admit to NICU for post-delivery evaluation/management as needed \par 3.	Pediatric Surgery to follow \par \par Counseling/Coordination of Care: 60 minutes was spent on total encounter. Greater than 50% of the encounter time was spent face-to-face on counseling; \par \par Thank you for allowing us to participate in the care of your patient.  I will continue to monitor the progress throughout the pregnancy and am available to meet again if additional questions or concerns arise. \par

## 2023-05-23 ENCOUNTER — APPOINTMENT (OUTPATIENT)
Dept: OBGYN | Facility: CLINIC | Age: 33
End: 2023-05-23
Payer: COMMERCIAL

## 2023-05-23 VITALS
SYSTOLIC BLOOD PRESSURE: 125 MMHG | HEIGHT: 64 IN | DIASTOLIC BLOOD PRESSURE: 70 MMHG | BODY MASS INDEX: 26.52 KG/M2 | WEIGHT: 155.31 LBS | HEART RATE: 70 BPM

## 2023-05-23 PROCEDURE — 99213 OFFICE O/P EST LOW 20 MIN: CPT

## 2023-05-24 ENCOUNTER — APPOINTMENT (OUTPATIENT)
Dept: ANTEPARTUM | Facility: CLINIC | Age: 33
End: 2023-05-24

## 2023-05-30 ENCOUNTER — NON-APPOINTMENT (OUTPATIENT)
Age: 33
End: 2023-05-30

## 2023-06-03 LAB
BACTERIA UR CULT: NORMAL
C TRACH RRNA SPEC QL NAA+PROBE: NOT DETECTED
N GONORRHOEA RRNA SPEC QL NAA+PROBE: NOT DETECTED
SOURCE AMPLIFICATION: NORMAL

## 2023-06-07 ENCOUNTER — ASOB RESULT (OUTPATIENT)
Age: 33
End: 2023-06-07

## 2023-06-07 ENCOUNTER — APPOINTMENT (OUTPATIENT)
Dept: ANTEPARTUM | Facility: CLINIC | Age: 33
End: 2023-06-07
Payer: COMMERCIAL

## 2023-06-07 PROCEDURE — 76816 OB US FOLLOW-UP PER FETUS: CPT

## 2023-06-07 PROCEDURE — 76820 UMBILICAL ARTERY ECHO: CPT | Mod: 59

## 2023-06-14 ENCOUNTER — APPOINTMENT (OUTPATIENT)
Dept: ANTEPARTUM | Facility: CLINIC | Age: 33
End: 2023-06-14
Payer: COMMERCIAL

## 2023-06-14 ENCOUNTER — ASOB RESULT (OUTPATIENT)
Age: 33
End: 2023-06-14

## 2023-06-14 ENCOUNTER — NON-APPOINTMENT (OUTPATIENT)
Age: 33
End: 2023-06-14

## 2023-06-14 ENCOUNTER — APPOINTMENT (OUTPATIENT)
Dept: OBGYN | Facility: CLINIC | Age: 33
End: 2023-06-14
Payer: COMMERCIAL

## 2023-06-14 VITALS
HEIGHT: 64 IN | HEART RATE: 71 BPM | SYSTOLIC BLOOD PRESSURE: 115 MMHG | WEIGHT: 157 LBS | DIASTOLIC BLOOD PRESSURE: 74 MMHG | BODY MASS INDEX: 26.8 KG/M2

## 2023-06-14 PROCEDURE — 0502F SUBSEQUENT PRENATAL CARE: CPT

## 2023-06-14 PROCEDURE — 99214 OFFICE O/P EST MOD 30 MIN: CPT | Mod: 25

## 2023-06-14 PROCEDURE — 76819 FETAL BIOPHYS PROFIL W/O NST: CPT

## 2023-06-15 ENCOUNTER — NON-APPOINTMENT (OUTPATIENT)
Age: 33
End: 2023-06-15

## 2023-06-16 ENCOUNTER — APPOINTMENT (OUTPATIENT)
Dept: ANTEPARTUM | Facility: CLINIC | Age: 33
End: 2023-06-16
Payer: COMMERCIAL

## 2023-06-16 ENCOUNTER — APPOINTMENT (OUTPATIENT)
Dept: ANTEPARTUM | Facility: CLINIC | Age: 33
End: 2023-06-16

## 2023-06-16 PROCEDURE — 36415 COLL VENOUS BLD VENIPUNCTURE: CPT

## 2023-06-21 ENCOUNTER — NON-APPOINTMENT (OUTPATIENT)
Age: 33
End: 2023-06-21

## 2023-06-29 ENCOUNTER — APPOINTMENT (OUTPATIENT)
Dept: OBGYN | Facility: CLINIC | Age: 33
End: 2023-06-29
Payer: COMMERCIAL

## 2023-06-29 VITALS
DIASTOLIC BLOOD PRESSURE: 75 MMHG | BODY MASS INDEX: 27.14 KG/M2 | HEART RATE: 79 BPM | HEIGHT: 64 IN | SYSTOLIC BLOOD PRESSURE: 117 MMHG | WEIGHT: 159 LBS

## 2023-06-29 DIAGNOSIS — Z34.03 ENCOUNTER FOR SUPERVISION OF NORMAL FIRST PREGNANCY, THIRD TRIMESTER: ICD-10-CM

## 2023-06-29 DIAGNOSIS — O35.FXX0: ICD-10-CM

## 2023-06-29 PROCEDURE — 0502F SUBSEQUENT PRENATAL CARE: CPT

## 2023-07-05 ENCOUNTER — APPOINTMENT (OUTPATIENT)
Dept: ANTEPARTUM | Facility: CLINIC | Age: 33
End: 2023-07-05

## 2023-07-10 ENCOUNTER — APPOINTMENT (OUTPATIENT)
Dept: OBGYN | Facility: CLINIC | Age: 33
End: 2023-07-10
Payer: COMMERCIAL

## 2023-07-10 VITALS
BODY MASS INDEX: 27.49 KG/M2 | HEIGHT: 64 IN | SYSTOLIC BLOOD PRESSURE: 114 MMHG | DIASTOLIC BLOOD PRESSURE: 76 MMHG | HEART RATE: 80 BPM | WEIGHT: 161 LBS

## 2023-07-10 PROCEDURE — 0502F SUBSEQUENT PRENATAL CARE: CPT

## 2023-07-10 RX ORDER — FOLIC ACID/MULTIVIT,IRON,MINER 0.4MG-18MG
TABLET ORAL
Refills: 0 | Status: ACTIVE | COMMUNITY

## 2023-07-10 RX ORDER — METRONIDAZOLE 500 MG/1
500 TABLET ORAL TWICE DAILY
Qty: 14 | Refills: 1 | Status: DISCONTINUED | COMMUNITY
Start: 2022-06-09 | End: 2023-07-10

## 2023-07-12 ENCOUNTER — APPOINTMENT (OUTPATIENT)
Dept: ANTEPARTUM | Facility: CLINIC | Age: 33
End: 2023-07-12
Payer: COMMERCIAL

## 2023-07-12 ENCOUNTER — ASOB RESULT (OUTPATIENT)
Age: 33
End: 2023-07-12

## 2023-07-12 PROCEDURE — ZZZZZ: CPT

## 2023-07-12 PROCEDURE — 76818 FETAL BIOPHYS PROFILE W/NST: CPT

## 2023-07-17 ENCOUNTER — INPATIENT (INPATIENT)
Facility: HOSPITAL | Age: 33
LOS: 4 days | Discharge: HOME | End: 2023-07-22
Attending: OBSTETRICS & GYNECOLOGY | Admitting: OBSTETRICS & GYNECOLOGY
Payer: COMMERCIAL

## 2023-07-17 ENCOUNTER — TRANSCRIPTION ENCOUNTER (OUTPATIENT)
Age: 33
End: 2023-07-17

## 2023-07-17 VITALS
RESPIRATION RATE: 16 BRPM | DIASTOLIC BLOOD PRESSURE: 74 MMHG | SYSTOLIC BLOOD PRESSURE: 119 MMHG | HEART RATE: 86 BPM | TEMPERATURE: 98 F

## 2023-07-17 DIAGNOSIS — O28.3 ABNORMAL ULTRASONIC FINDING ON ANTENATAL SCREENING OF MOTHER: ICD-10-CM

## 2023-07-17 LAB
BASOPHILS # BLD AUTO: 0.02 K/UL — SIGNIFICANT CHANGE UP (ref 0–0.2)
BASOPHILS NFR BLD AUTO: 0.2 % — SIGNIFICANT CHANGE UP (ref 0–2)
BLD GP AB SCN SERPL QL: NEGATIVE — SIGNIFICANT CHANGE UP
COVID-19 SPIKE DOMAIN AB INTERP: POSITIVE
COVID-19 SPIKE DOMAIN ANTIBODY RESULT: 152 U/ML — HIGH
EOSINOPHIL # BLD AUTO: 0.46 K/UL — SIGNIFICANT CHANGE UP (ref 0–0.5)
EOSINOPHIL NFR BLD AUTO: 4 % — SIGNIFICANT CHANGE UP (ref 0–6)
HCT VFR BLD CALC: 33.9 % — LOW (ref 34.5–45)
HGB BLD-MCNC: 11.7 G/DL — SIGNIFICANT CHANGE UP (ref 11.5–15.5)
IANC: 8.24 K/UL — HIGH (ref 1.8–7.4)
IMM GRANULOCYTES NFR BLD AUTO: 1 % — HIGH (ref 0–0.9)
LYMPHOCYTES # BLD AUTO: 1.9 K/UL — SIGNIFICANT CHANGE UP (ref 1–3.3)
LYMPHOCYTES # BLD AUTO: 16.5 % — SIGNIFICANT CHANGE UP (ref 13–44)
MCHC RBC-ENTMCNC: 33 PG — SIGNIFICANT CHANGE UP (ref 27–34)
MCHC RBC-ENTMCNC: 34.5 GM/DL — SIGNIFICANT CHANGE UP (ref 32–36)
MCV RBC AUTO: 95.5 FL — SIGNIFICANT CHANGE UP (ref 80–100)
MONOCYTES # BLD AUTO: 0.78 K/UL — SIGNIFICANT CHANGE UP (ref 0–0.9)
MONOCYTES NFR BLD AUTO: 6.8 % — SIGNIFICANT CHANGE UP (ref 2–14)
NEUTROPHILS # BLD AUTO: 8.24 K/UL — HIGH (ref 1.8–7.4)
NEUTROPHILS NFR BLD AUTO: 71.5 % — SIGNIFICANT CHANGE UP (ref 43–77)
NRBC # BLD: 0 /100 WBCS — SIGNIFICANT CHANGE UP (ref 0–0)
NRBC # FLD: 0 K/UL — SIGNIFICANT CHANGE UP (ref 0–0)
PLATELET # BLD AUTO: 186 K/UL — SIGNIFICANT CHANGE UP (ref 150–400)
RBC # BLD: 3.55 M/UL — LOW (ref 3.8–5.2)
RBC # FLD: 12.3 % — SIGNIFICANT CHANGE UP (ref 10.3–14.5)
RH IG SCN BLD-IMP: POSITIVE — SIGNIFICANT CHANGE UP
RH IG SCN BLD-IMP: POSITIVE — SIGNIFICANT CHANGE UP
SARS-COV-2 IGG+IGM SERPL QL IA: 152 U/ML — HIGH
SARS-COV-2 IGG+IGM SERPL QL IA: POSITIVE
T PALLIDUM AB TITR SER: NEGATIVE — SIGNIFICANT CHANGE UP
WBC # BLD: 11.51 K/UL — HIGH (ref 3.8–10.5)
WBC # FLD AUTO: 11.51 K/UL — HIGH (ref 3.8–10.5)

## 2023-07-17 RX ORDER — SODIUM CHLORIDE 9 MG/ML
1000 INJECTION, SOLUTION INTRAVENOUS
Refills: 0 | Status: DISCONTINUED | OUTPATIENT
Start: 2023-07-17 | End: 2023-07-18

## 2023-07-17 RX ORDER — OXYTOCIN 10 UNIT/ML
333.33 VIAL (ML) INJECTION
Qty: 20 | Refills: 0 | Status: DISCONTINUED | OUTPATIENT
Start: 2023-07-17 | End: 2023-07-20

## 2023-07-17 RX ORDER — CITRIC ACID/SODIUM CITRATE 300-500 MG
15 SOLUTION, ORAL ORAL EVERY 6 HOURS
Refills: 0 | Status: DISCONTINUED | OUTPATIENT
Start: 2023-07-17 | End: 2023-07-18

## 2023-07-17 RX ORDER — CHLORHEXIDINE GLUCONATE 213 G/1000ML
1 SOLUTION TOPICAL DAILY
Refills: 0 | Status: DISCONTINUED | OUTPATIENT
Start: 2023-07-17 | End: 2023-07-18

## 2023-07-17 RX ADMIN — SODIUM CHLORIDE 125 MILLILITER(S): 9 INJECTION, SOLUTION INTRAVENOUS at 18:24

## 2023-07-17 NOTE — OB RN PATIENT PROFILE - NSMATERNALFETALCONCERNS_OBGYN_ALL_OB_FT
Maternal/Fetal Alert  LEFT  sided diaphragmatic hernia with dextropostion of the fetal heart and fetal stomach and portion of the liver seen in LEFT hemithorax ( 6/15/2023)   s/p Peds Surgery consult   s/p NICU cinsult   ALERT NICU   s/p FETAL MDM 6/15/2023   Stephenie Tee RN 4/28/2023

## 2023-07-17 NOTE — OB PROVIDER H&P - NSHPPHYSICALEXAM_GEN_ALL_CORE
EFH: 150/mod/+acels/-decels  [place]: irregular  VE: 0/0/-3  TAUS: cephalic     Gen: NAD  Pulm: Nonlabored respirations  Abd: Nontender, gravid  Ext: no edema

## 2023-07-17 NOTE — OB RN PATIENT PROFILE - COMFORT/ACCEPTABLE PAIN LEVEL (0-10)
Health Maintenance Due   Topic Date Due   • Hepatitis B Vaccine (1 of 3 - 3-dose series) Never done   • Pneumococcal Vaccine 0-64 (1 - PCV) Never done   • DTaP/Tdap/Td Vaccine (2 - Td or Tdap) 01/09/2018   • COVID-19 Vaccine (4 - Moderna series) 12/22/2021       Patient is due for topics as listed above but is not proceeding with Immunization(s) COVID-19, Dtap/Tdap/Td, Hep B and Pneumococcal at this time.    6

## 2023-07-17 NOTE — OB PROVIDER H&P - NSLOWPPHRISK_OBGYN_A_OB
No previous uterine incision/Chris Pregnancy/Less than or equal to 4 previous vaginal births/No known bleeding disorder/No history of postpartum hemorrhage/No other PPH risks indicated

## 2023-07-17 NOTE — OB RN PATIENT PROFILE - CURRENT PREGNANCY COMPLICATIONS, OB PROFILE
LEFT  sided diaphragmatic hernia with dextropostion of the fetal heart and fetal stomach and portion of the liver seen in LEFT hemithorax/Other

## 2023-07-17 NOTE — OB PROVIDER H&P - HISTORY OF PRESENT ILLNESS
R1 H&P    Pt is a 33y/o  at 38w2d admitted for IOL for fetal anomaly. Patient has fetal movement, contractions, no leakage of fluid or vaginal bleeding.   Prenatal course: fetal anomaly (L sided diaphragmatic hernia with dextropostion of the fetal heart and fetal stomach and portion of the liver seen in L hemithorax), no hospitalizations or ED visits    GBS: neg  EFW: 2722g    OBHx: Denies   GynHx: denies h/o abnormal paps, STI's, fibroids, cysts  PMHx: childhood asthma   PSHx: Denies  Med: PNVs  All: NKDA  SH: denies alcohol, tobacco, or drug use  Psych: denies h/o anxiety or depression

## 2023-07-17 NOTE — OB PROVIDER H&P - ASSESSMENT
A&P:   Labor: admit to L&D  -PO cytotec  -routine labs  -EFM/toco  -NPO, IV hydration  Fetal: cat 1 tracing, fetal status reassuring  GBS: neg  Analgesia:       Discussed with  Selvin Gusman PGY-1   A&P:   Labor: admit to L&D  -PO cytotec  -routine labs  -EFM/toco  -NPO, IV hydration  Fetal: cat 1 tracing, fetal status reassuring  GBS: neg      Discussed with Dr. Romina Gusman PGY-1

## 2023-07-18 RX ORDER — OXYTOCIN 10 UNIT/ML
333.33 VIAL (ML) INJECTION
Qty: 20 | Refills: 0 | Status: DISCONTINUED | OUTPATIENT
Start: 2023-07-18 | End: 2023-07-20

## 2023-07-18 RX ORDER — LANOLIN
1 OINTMENT (GRAM) TOPICAL EVERY 6 HOURS
Refills: 0 | Status: DISCONTINUED | OUTPATIENT
Start: 2023-07-18 | End: 2023-07-22

## 2023-07-18 RX ORDER — SODIUM CHLORIDE 9 MG/ML
1000 INJECTION, SOLUTION INTRAVENOUS
Refills: 0 | Status: DISCONTINUED | OUTPATIENT
Start: 2023-07-18 | End: 2023-07-20

## 2023-07-18 RX ORDER — OXYCODONE HYDROCHLORIDE 5 MG/1
5 TABLET ORAL ONCE
Refills: 0 | Status: DISCONTINUED | OUTPATIENT
Start: 2023-07-18 | End: 2023-07-22

## 2023-07-18 RX ORDER — ACETAMINOPHEN 500 MG
975 TABLET ORAL
Refills: 0 | Status: DISCONTINUED | OUTPATIENT
Start: 2023-07-18 | End: 2023-07-22

## 2023-07-18 RX ORDER — IBUPROFEN 200 MG
600 TABLET ORAL EVERY 6 HOURS
Refills: 0 | Status: COMPLETED | OUTPATIENT
Start: 2023-07-18 | End: 2024-06-15

## 2023-07-18 RX ORDER — KETOROLAC TROMETHAMINE 30 MG/ML
30 SYRINGE (ML) INJECTION EVERY 6 HOURS
Refills: 0 | Status: DISCONTINUED | OUTPATIENT
Start: 2023-07-18 | End: 2023-07-19

## 2023-07-18 RX ORDER — MAGNESIUM HYDROXIDE 400 MG/1
30 TABLET, CHEWABLE ORAL
Refills: 0 | Status: DISCONTINUED | OUTPATIENT
Start: 2023-07-18 | End: 2023-07-22

## 2023-07-18 RX ORDER — DIPHENHYDRAMINE HCL 50 MG
25 CAPSULE ORAL EVERY 6 HOURS
Refills: 0 | Status: DISCONTINUED | OUTPATIENT
Start: 2023-07-18 | End: 2023-07-22

## 2023-07-18 RX ORDER — OXYTOCIN 10 UNIT/ML
2 VIAL (ML) INJECTION
Qty: 30 | Refills: 0 | Status: DISCONTINUED | OUTPATIENT
Start: 2023-07-18 | End: 2023-07-18

## 2023-07-18 RX ORDER — TETANUS TOXOID, REDUCED DIPHTHERIA TOXOID AND ACELLULAR PERTUSSIS VACCINE, ADSORBED 5; 2.5; 8; 8; 2.5 [IU]/.5ML; [IU]/.5ML; UG/.5ML; UG/.5ML; UG/.5ML
0.5 SUSPENSION INTRAMUSCULAR ONCE
Refills: 0 | Status: DISCONTINUED | OUTPATIENT
Start: 2023-07-18 | End: 2023-07-22

## 2023-07-18 RX ORDER — SIMETHICONE 80 MG/1
80 TABLET, CHEWABLE ORAL EVERY 4 HOURS
Refills: 0 | Status: DISCONTINUED | OUTPATIENT
Start: 2023-07-18 | End: 2023-07-22

## 2023-07-18 RX ORDER — HEPARIN SODIUM 5000 [USP'U]/ML
5000 INJECTION INTRAVENOUS; SUBCUTANEOUS EVERY 12 HOURS
Refills: 0 | Status: DISCONTINUED | OUTPATIENT
Start: 2023-07-18 | End: 2023-07-22

## 2023-07-18 RX ORDER — OXYCODONE HYDROCHLORIDE 5 MG/1
5 TABLET ORAL
Refills: 0 | Status: DISCONTINUED | OUTPATIENT
Start: 2023-07-18 | End: 2023-07-22

## 2023-07-18 RX ADMIN — Medication 2 MILLIUNIT(S)/MIN: at 10:30

## 2023-07-18 RX ADMIN — CHLORHEXIDINE GLUCONATE 1 APPLICATION(S): 213 SOLUTION TOPICAL at 10:40

## 2023-07-18 NOTE — OB PROVIDER LABOR PROGRESS NOTE - NS_SUBJECTIVE/OBJECTIVE_OBGYN_ALL_OB_FT
Patient noted to have decelerations, not picking up contraction pattern. VE 4/60/-3, cervix soft, more anterior. IUPC placed to  contractions. Pitocin on hold. Will reposition patient, continue IVH for resuscitation. Otherwise moderate variability, accelerations present.    Dennis Ramos MD

## 2023-07-18 NOTE — OB PROVIDER LABOR PROGRESS NOTE - NS_SUBJECTIVE/OBJECTIVE_OBGYN_ALL_OB_FT
R2 Labor Note    S: Patient evaluated at bedside for cervical change after CB came out.    O:  T(C): 36.8 (07-18-23 @ 05:27), Max: 36.8 (07-18-23 @ 04:06)  HR: 47 (07-18-23 @ 05:27) (47 - 86)  BP: 108/67 (07-18-23 @ 05:27) (89/52 - 121/75)  RR: 15 (07-18-23 @ 05:27) (14 - 18)

## 2023-07-18 NOTE — OB PROVIDER LABOR PROGRESS NOTE - NS_SUBJECTIVE/OBJECTIVE_OBGYN_ALL_OB_FT
Pt seen for cervical examination late entry due to clinical duties    Pt seen for cervical examination

## 2023-07-18 NOTE — OB RN INTRAOPERATIVE NOTE - NSSELHIDDEN_OBGYN_ALL_OB_FT
[NS_DeliveryAttending1_OBGYN_ALL_OB_FT:[email or web address]=],[NS_DeliveryAssist1_OBGYN_ALL_OB_FT:[email or web address]=],[NS_DeliveryRN_OBGYN_ALL_OB_FT:[id number]]

## 2023-07-18 NOTE — OB NEONATOLOGY/PEDIATRICIAN DELIVERY SUMMARY - NSPEDSNEONOTESA_OBGYN_ALL_OB_FT
38.3wk male born via CS to a 31 y/o  mother. 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  No significant maternal history. Maternal labs include Blood Type O+, HIV - , RPR NR , Rubella I , Hep B - , GBS - . ROM at time of  with clear fluids. Highest maternal temp: 37 C, EOS 0.05. Baby emerged vigorous and crying, was taken to  stabilization unit. Baby was warmed, dried, suctioned, and stimulated with APGARS of 7/8 . Resuscitation included: immediate intubation. Mom plans to initiate breastfeeding and formula feed, consents Hep B vaccine and declines circ.    Baby was delivered at 1921. Intubation was successful at approx. 2 minutes of life and ventilation was started at 25/5 , FiO2 100%. At 8 minutes of life, orogastric tube was placed. At 9:30 minutes of life, FiO2 was lowered to 80% with baby's O2 sats at 100%. At 10:30 minutes of life, FiO2 was lowered again to 50%, sats were stable over 94%. Baby was transported to the NICU at 19 minutes of life.     Physical Exam:  Gen: +grimace, strong cry following delivery  HEENT:  anterior fontanel open soft and flat, nondysmorphic facies, no cleft lip/palate, ears normal set, R ear tag, nares clinically patent  Resp: Coarse breath sounds on the left, clear to auscultation on the right  Cardio: regular rate and rhythm, no murmur  Abd: soft, non distended, umbilical cord with 3 vessels  Neuro: +palmar and plantar grasp, normal tone  Extremities: moving all extremities spontaneously and symmetrically  Skin: pink, warm, acrocyanosis  Genitals: Normal male anatomy, testicles palpable in scrotum bilaterally 38.3wk male born via primary unscheduled CS for NRFHR to a 31 y/o  mother. 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 No significant maternal history. Maternal labs include Blood Type O+, HIV - , RPR NR , Rubella I , Hep B - , GBS - on . SROM on  at 13:55 with clear fluids, approximately 6.5 hours. Highest maternal temp: 37 C. EOS 0.17. Delivery was complicated by NRFHR. Baby emerged with weak cry, good tone and was immediately taken to  stabilization unit. Delayed cord clamping was not performed. Baby was warmed, dried, suctioned, and stimulated with APGARS of 7/9. Resuscitation included: intubation x3 attempts. At 1 MOL, HR>100 and intubation attempted by Dr. Miguel. At 2 MOL, sats dropped to low 60% and HR dropped to 70s. Successful intubation achieved by approximately 3 MOL and PPV initiated at 25/5, FiO2 100%. HR and SpO2 baylee appropriately. At 8 MOL, orogastric tube was placed. At 9.5 MOL, FiO2 was titrated to 80% for SpO2 at 100%. At 10.5 MOL, FiO2 was titrated again to 50% for goal sats. Temperature prior to transfer: 38.4 C. Baby was transported to the NICU at 19 MOL. Dr. Perry, Attending Neonatologist was present for delivery and resuscitation. Mom plans to initiate breastfeeding and formula, consents Hep B vaccine and declines circ.    Physical Exam:  Gen: +grimace, NAD  HEENT:  anterior fontanel open soft and flat, nondysmorphic facies, no cleft lip/palate, ears normal set, +R ear tag, nares clinically patent, +intubated  Resp: +Coarse breath sounds on the left, clear to auscultation on the right, no increased WOB, +intubated   Cardio: normal S1/S2, regular rate and rhythm, no murmur appreciated  Abd: soft, non tender, non distended, + bowel sounds, umbilical cord with 3 vessels  Back: spine midline, no sacral dimple or tuft of hair  Neuro: +grasp/suck/carol/palmar/plantar, normal tone  Extremities: negative jackson and ortolani, moving all extremities, full range of motion x 4, no crepitus  Skin: pink, warm, acrocyanosis  Genitals: Normal male anatomy, testicles palpable in scrotum b/l, Mina 1, anus patent  Abd: soft, non distended, umbilical cord with 3 vessels  Neuro: +palmar and plantar grasp, normal tone  Extremities: moving all extremities spontaneously and symmetrically  Skin: pink, warm, acrocyanosis  Genitals: Normal male anatomy, testicles palpable in scrotum bilaterally 38.3wk male born via primary unscheduled CS for NRFHR to a 31 y/o  mother. NICU resuc team called to OR3 for fetal anomalies. Pregnancy complicated by fetal echo showing congenital diaphragmatic hernia with dextroposition of heart and stomach, and portion of liver seen in left hemithorax. Remainder of pregnancy uncomplicated. No significant maternal history. Maternal labs include Blood Type O+, HIV - , RPR NR , Rubella I , Hep B - , GBS - on . SROM on  at 13:55 with clear fluids, approximately 6.5 hours. Highest maternal temp: 37 C. EOS 0.17. Delivery was complicated by NRFHR. Baby emerged with weak cry, good tone and was immediately taken to  stabilization unit. Delayed cord clamping was not performed. Baby was warmed, dried, suctioned, and stimulated with APGARS of 7/9. Resuscitation included: intubation x3 attempts. At 1 MOL, HR>100 and intubation first attempted by Dr. Miguel. At 2 MOL, sats began to drop and HR dropped to 70s. Successful intubation achieved by approximately 3 MOL by Dr. Perry and PPV was initiated at 25/5, FiO2 100%. HR and SpO2 responded appropriately. Orogastric tube was placed at 8 MOL. At 9.5 MOL, FiO2 weaned to 80% for SpO2 100%. At 10.5 MOL, FiO2 was titrated again to 50% for goal sats. Baby was transported to the NICU at 19 MOL. Temperature prior to transfer: 38.4 C. Dr. Perry, Attending Neonatologist was present for delivery and resuscitation. Mom plans to initiate breastfeeding and formula, consents Hep B vaccine and declines circ.    Physical Exam:  Gen: +grimace, NAD  HEENT:  anterior fontanel open soft and flat, nondysmorphic facies, no cleft lip/palate, ears normal set, +R ear tag, nares clinically patent, +intubated  Resp: +Coarse breath sounds on the left, clear to auscultation on the right, no increased WOB, +intubated   Cardio: normal S1/S2, regular rate and rhythm, no murmur appreciated  Abd: soft, non tender, non distended, + bowel sounds, umbilical cord with 3 vessels  Back: spine midline, no sacral dimple or tuft of hair  Neuro: +grasp/suck/carol/palmar/plantar, normal tone  Extremities: negative jackson and ortolani, moving all extremities, full range of motion x 4, no crepitus  Skin: pink, warm, acrocyanosis  Genitals: Normal male anatomy, testicles palpable in scrotum b/l, Mina 1, anus patent  Abd: soft, non distended, umbilical cord with 3 vessels  Neuro: +palmar and plantar grasp, normal tone  Extremities: moving all extremities spontaneously and symmetrically  Skin: pink, warm, acrocyanosis  Genitals: Normal male anatomy, testicles palpable in scrotum bilaterally 38.3wk male born via primary unscheduled CS for NRFHR to a 34 y/o  mother. NICU resuc team called to OR3 for fetal anomalies. Pregnancy complicated by fetal echo showing congenital diaphragmatic hernia with dextroposition of heart and stomach, and portion of liver seen in left hemithorax. Remainder of pregnancy uncomplicated. No significant maternal history. Maternal labs include Blood Type O+, HIV - , RPR NR , Rubella I , Hep B - , GBS - on . SROM on  at 13:55 with clear fluids, approximately 6.5 hours. Highest maternal temp: 37 C. EOS 0.17. Delivery was complicated by NRFHR. Baby emerged with weak cry, good tone and was immediately taken to  stabilization unit. Delayed cord clamping was not performed. Baby was warmed, dried, suctioned, and stimulated with APGARS of 7/9. At 1 MOL, HR>100 by 2 MOL, sats began to drop and HR dropped to 70s. Successful intubation achieved by approximately 3 MOL by Dr. Perry and PPV was initiated at 25/5, FiO2 100%. HR and SpO2 responded appropriately. Orogastric tube was placed , FiO2 was titrated again to 50% for goal sats. Baby was transported to the NICU for further management. Temperature prior to transfer: 38.4 C. Dr. Perry, Attending Neonatologist was present for delivery and resuscitation. Mom plans to initiate breastfeeding and formula, consents Hep B vaccine and declines circ.    Physical Exam:  Gen: +grimace, NAD  HEENT:  anterior fontanel open soft and flat, nondysmorphic facies, no cleft lip/palate, ears normal set, +R ear tag, nares clinically patent, +intubated  Resp: +Coarse breath sounds on the left, clear to auscultation on the right, no increased WOB, +intubated   Cardio: normal S1/S2, regular rate and rhythm, no murmur appreciated  Abd: soft, non tender, non distended, + bowel sounds, umbilical cord with 3 vessels  Back: spine midline, no sacral dimple or tuft of hair  Neuro: +grasp/suck/carol/palmar/plantar, normal tone  Extremities: negative jackson and ortolani, moving all extremities, full range of motion x 4, no crepitus  Skin: pink, warm, acrocyanosis  Genitals: Normal male anatomy, testicles palpable in scrotum b/l, Mina 1, anus patent  Abd: soft, non distended, umbilical cord with 3 vessels  Neuro: +palmar and plantar grasp, normal tone  Extremities: moving all extremities spontaneously and symmetrically  Skin: pink, warm, acrocyanosis  Genitals: Normal male anatomy, testicles palpable in scrotum bilaterally

## 2023-07-18 NOTE — OB PROVIDER DELIVERY SUMMARY - NSPROVIDERDELIVERYNOTE_OBGYN_ALL_OB_FT
pLTCS for Cat 2 tracing  Viable male infant, apgars 7/9 , vertex presentation  Grossly normal uterus, tubes, ovaries.   Hysterotomy closed in two layers with caprosyn.    Good hemostasis noted.    QBL: 254  IVF: 900  UOP: 100    Dictation# pLTCS for Cat 2 tracing  Viable male infant, apgars 7/9 , vertex presentation  Grossly normal uterus, tubes, ovaries.   Hysterotomy closed in two layers with caprosyn.    Good hemostasis noted.    QBL: 254  IVF: 900  UOP: 100    Dictation#[id number]

## 2023-07-18 NOTE — OB PROVIDER LABOR PROGRESS NOTE - NS_SUBJECTIVE/OBJECTIVE_OBGYN_ALL_OB_FT
R2 Labor Note    S: Patient evaluated at bedside for placement of CB. CB placed with 60cc/60cc NS.    O:  T(C): 36.5 (07-17-23 @ 23:58), Max: 36.7 (07-17-23 @ 05:46)  HR: 64 (07-17-23 @ 23:58) (48 - 86)  BP: 110/61 (07-17-23 @ 23:58) (89/52 - 121/75)  RR: 15 (07-17-23 @ 23:58) (14 - 18)

## 2023-07-18 NOTE — CHART NOTE - NSCHARTNOTEFT_GEN_A_CORE
Patient agrees to  section. Discussed risks including bleeding, infection, injury to surrounding structures, increased hospital stay. Patient and her  voice understanding, all questions answered to their satisfaction. Consent signed and witnessed.  Anesthesia, charge RN and ANM made aware. Will notify NICU recess team to be present in OR. Will start moving patient to the OR.    Dennis Ramos MD
PTA called for cat 2 tracing, with recurrent late decelerations and fetal tachycardia, despite resuscitative methods and position changes. OB SVC attending, covering RN, ANM present for PTA.   Overall reassuring FHR tracing, moderate variability, however, after epidural patient received terbutaline x1 for recurrent late decelerations.  Since then, recurrent late decelerations continued, and have not been able to turn pitocin on.  Patient received ephedrine x2 for hypotension with no improvement. Patient remains afebrile.  Prior to PTA, discussion held with patient and her , explaining findings and concerns for fetal distress. Discussed with patient that we would try to give some more time, but if no improvement in tracing, would recommend  delivery for cat 2 remote from delivery. Patient and her  voice understanding, however, would like more time.  Reviewed tracing with all at PTA, because of good variability, agreed to reassess after 20-30 min. If still cat 2, will recommend  delivery.    Dennis Ramos MD
Patient seen at bedside. Still cat 2 tracing, unable to augment with pitocin. Recommending  delivery for cat 2 tracing remote from delivery.  Patient requesting a little more time. Discussed with patient that we have been giving time for the tracing to improve, attempting maternal resuscitation measures with position changes, however, still no improvement in fetal heart rate tracing. Patient would like 10 more minutes to discuss with her .  Will return to obtain consent. Charge RN is aware, will let anesthesia know as well.    Dennis Ramos MD

## 2023-07-18 NOTE — OB RN DELIVERY SUMMARY - BABYS CARE PROVIDER NAME, OB PROFILE
Physical Therapy Daily Treatment      Visit Count: 3  Plan of Care Dates: Initial: 3/9/2017 Through: 5/4/2017  Insurance Information: THERAPY BENEFITS:     PAYOR:  [employer]  VISIT LIMIT: 24 PT OT EVERY 60 DAYS  AUTHORIZATION NEEDED: NO  NOTES:     DEDUCTIBLE: $ MET: $  OUT OF POCKET: $ MET: $  COINSURANCE:  COPAY: $0  REF #: ONLINE  THIS QUOTE OF BENEFITS IS NOT A GUARANTEE OF PAYMENT  Next Referring Provider Visit: none scheduled    Referred by: Dr. Shannan Ferrara MD  Medical Diagnosis (from order):  Sprain of neck [S13.9XXA]  - Primary  Insurance: 1. [employer]  2. N/A    Date of Onset: new onset; 1/1/2017   Diagnosis Precautions: none  Relevant co-morbidities and medications: history of TMJ issues   Relevant Tests: None   Radiograph: (from January 2017)   Shows PCR: progressive condylar resorption right more than left and posterior positioning of the condyles when teeth clenched together.     SUBJECTIVE   Worked on the self soft tissue mobilization work on the right masseter at the mandible, and on the left tried to do the temporalis, by sweeping through the superior aspect of the cheek.    Current Pain: 2/10. \"uncomfortable\" feels unstable like she wouldn't feel like eating a bagel for breakfast. Neck feels tight but not hurting.     Functional Change: tends to feel tight and weak in the morning after wearing the splint at night.      OBJECTIVE   Left C0-1 restricted in all planes.  left medial pterygoid in taut band, right side tight but not to degree of the left.     Treatment   Manual Therapy:   patient in supine worked on the upper cervical region, suboccipital releases and mobilizations to increase mobility in sidebending in particular both tot he right and the left. Repeated suboccipital release work to increase general mobility.   Worked on the medial pterygoid with patient in supine: clearing through teeth to lower lateral aspect of mandible bilaterally. Instructed patient in self soft tissue  mobilization .       Current Home Program (not performed this date except as noted above):   Reviewed current HEP with demonstration of axial extension and tongue up opening.       ASSESSMENT   Immediate improvement in jaw mobility and in upper cervical rest position (less extended) with the work on medial pterygoid.      Pain after treatment: 0/10  Result of above outlined education: Verbalizes understanding and Demonstrates understanding    Goals:       To be obtained by end of this plan of care:  1. Patient independent with modified and progressed home exercise program.  2. Patient will demonstrate proper body mechanics for sitting and standing activities.  3. Patient will decrease cervical pain to 2/10 to aid in activity tolerance particularly for lifting for home and upcoming nuptials.   4. Patient will increase cervical active range of motion to within normal limits to aid in looking in blind spot for safe driving and visually scanning horizon to safely ambulate.  5. Patient will increase cervical deep neck flexor strength to 10 reps of 10 second holds with 4mmHg change in pressure or more to aid in activity tolerance for sustained postures.  6. Patient will be able to sleep 6 hours or more without disruption from pain.     PLAN   Check on pterygoid muscles and on the temporalis. Mobilizations? work on deep neck flexors.      THERAPY DAILY BILLING   Primary Insurance:  [employer]  Secondary Insurance: N/A    Evaluation Procedures:  No evaluation codes were used on this date of service      Timed Procedures:  Manual Therapy, 40 minutes    Untimed Procedures:  No untimed codes were used on this date of service    Total Treatment Time: 40 minutes        Deon

## 2023-07-18 NOTE — OB PROVIDER DELIVERY SUMMARY - NSSELHIDDEN_OBGYN_ALL_OB_FT
[NS_[email or web address]_ALL_OB_FT:[email or web address]],[NS_DeliveryAssist1_OBGYN_ALL_OB_FT:[email or web address]=],[NS_DeliveryRN_OBGYN_ALL_OB_FT:[id number]]

## 2023-07-18 NOTE — OB PROVIDER LABOR PROGRESS NOTE - NS_OBIHIFHRDETAILS_OBGYN_ALL_OB_FT
Cat 2 with moderate variablity and accels with tracing improving since terbutaline; with less deccels with ctx.
150/mod/+accels/-decels
150/mod elgin/+ accels/ occasional lates overall reassuring
150/mod elgin/+ accels/intermitent variables

## 2023-07-18 NOTE — OB RN DELIVERY SUMMARY - NSSELHIDDEN_OBGYN_ALL_OB_FT
[NS_[email or web address]_ALL_OB_FT:[email or web address]=],[NS_DeliveryAssist1_OBGYN_ALL_OB_FT:[email or web address]=],[NS_DeliveryRN_OBGYN_ALL_OB_FT:[id number]]

## 2023-07-18 NOTE — OB PROVIDER LABOR PROGRESS NOTE - NS_SUBJECTIVE/OBJECTIVE_OBGYN_ALL_OB_FT
Patient with recurrent late decelerations. Pt was checked and was found to be the same exam. Terbutaline x1 was given and nurse was ask to get anesthesia in the room to evaluate lower than baseline BP.

## 2023-07-18 NOTE — OB PROVIDER LABOR PROGRESS NOTE - ASSESSMENT
-Terb x1 given  -CE as indicated  -FHT monitoring  -Anesthesia to assess lower than baseline BP    DTaveras PGY 2
A/P 32y  @38w3d IOL for FA for diaphragmatic hernia  -IOL: CB placed with 60cc/60cc NS at 1a, c/w PO cytotec  -Cat 1 tracing  -GBS neg  -EFW 1826  -Analgesia - not requesting pain mgmt at this time  -Anticipate     Plan per Dr. Ruben Miguel, PGY-2
No membranes noted  moderate amount clear fluid noted to release with cervical exam  Variables improved with repositioning  Continue Pitocin  Epidural PRN  KENNETH Ramos at bedside    AMY delatorre
A/P 32y  @38w3d IOL for FA for diaphragmatic hernia  -IOL: s/p CB, c/w PO  -Cat 1 tracing  -GBS neg  -EFW 2772  -Analgesia - not requesting pain mgmt at this time  -Anticipate     d/w Dr. Ruben Miguel, PGY-2
A/P 32y  @38w3d IOL for FA for diaphragmatic hernia    -IOL: s/p CB, c/w PO  -For pitocin  -Analgesia - not requesting pain mgmt at this time  -Positioned pt on birthing ball with wireless monitoring    d/w Dr. Richard delatorre, NP

## 2023-07-18 NOTE — OB RN DELIVERY SUMMARY - NS_SEPSISRSKCALC_OBGYN_ALL_OB_FT
EOS calculated successfully. EOS Risk Factor: 0.5/1000 live births ([facility] national incidence); GA=38w3d; Temp=99.14; ROM=5.433; GBS='Negative'; Antibiotics='No antibiotics or any antibiotics < 2 hrs prior to birth'

## 2023-07-18 NOTE — OB RN DELIVERY SUMMARY - NS_NEWBORNRN1_OBGYN_ALL_OB_FT
Dr. Chong informed of patients behavior.  New order for u/a and await Dr. Keys evaluation.     Arturo J

## 2023-07-19 ENCOUNTER — TRANSCRIPTION ENCOUNTER (OUTPATIENT)
Age: 33
End: 2023-07-19

## 2023-07-19 LAB
BASOPHILS # BLD AUTO: 0.03 K/UL — SIGNIFICANT CHANGE UP (ref 0–0.2)
BASOPHILS NFR BLD AUTO: 0.2 % — SIGNIFICANT CHANGE UP (ref 0–2)
EOSINOPHIL # BLD AUTO: 0.01 K/UL — SIGNIFICANT CHANGE UP (ref 0–0.5)
EOSINOPHIL NFR BLD AUTO: 0.1 % — SIGNIFICANT CHANGE UP (ref 0–6)
HCT VFR BLD CALC: 31.4 % — LOW (ref 34.5–45)
HGB BLD-MCNC: 10.8 G/DL — LOW (ref 11.5–15.5)
IANC: 17.08 K/UL — HIGH (ref 1.8–7.4)
IMM GRANULOCYTES NFR BLD AUTO: 0.8 % — SIGNIFICANT CHANGE UP (ref 0–0.9)
LYMPHOCYTES # BLD AUTO: 1.53 K/UL — SIGNIFICANT CHANGE UP (ref 1–3.3)
LYMPHOCYTES # BLD AUTO: 7.7 % — LOW (ref 13–44)
MCHC RBC-ENTMCNC: 32.7 PG — SIGNIFICANT CHANGE UP (ref 27–34)
MCHC RBC-ENTMCNC: 34.4 GM/DL — SIGNIFICANT CHANGE UP (ref 32–36)
MCV RBC AUTO: 95.2 FL — SIGNIFICANT CHANGE UP (ref 80–100)
MONOCYTES # BLD AUTO: 1.14 K/UL — HIGH (ref 0–0.9)
MONOCYTES NFR BLD AUTO: 5.7 % — SIGNIFICANT CHANGE UP (ref 2–14)
NEUTROPHILS # BLD AUTO: 17.08 K/UL — HIGH (ref 1.8–7.4)
NEUTROPHILS NFR BLD AUTO: 85.5 % — HIGH (ref 43–77)
NRBC # BLD: 0 /100 WBCS — SIGNIFICANT CHANGE UP (ref 0–0)
NRBC # FLD: 0 K/UL — SIGNIFICANT CHANGE UP (ref 0–0)
PLATELET # BLD AUTO: 151 K/UL — SIGNIFICANT CHANGE UP (ref 150–400)
RBC # BLD: 3.3 M/UL — LOW (ref 3.8–5.2)
RBC # FLD: 12.3 % — SIGNIFICANT CHANGE UP (ref 10.3–14.5)
WBC # BLD: 19.95 K/UL — HIGH (ref 3.8–10.5)
WBC # FLD AUTO: 19.95 K/UL — HIGH (ref 3.8–10.5)

## 2023-07-19 PROCEDURE — 59515 CESAREAN DELIVERY: CPT | Mod: U9,GC

## 2023-07-19 RX ORDER — IBUPROFEN 200 MG
1 TABLET ORAL
Qty: 0 | Refills: 0 | DISCHARGE
Start: 2023-07-19

## 2023-07-19 RX ORDER — IBUPROFEN 200 MG
600 TABLET ORAL EVERY 6 HOURS
Refills: 0 | Status: DISCONTINUED | OUTPATIENT
Start: 2023-07-19 | End: 2023-07-22

## 2023-07-19 RX ADMIN — Medication 30 MILLIGRAM(S): at 18:06

## 2023-07-19 RX ADMIN — Medication 975 MILLIGRAM(S): at 06:35

## 2023-07-19 RX ADMIN — Medication 30 MILLIGRAM(S): at 11:30

## 2023-07-19 RX ADMIN — Medication 30 MILLIGRAM(S): at 03:05

## 2023-07-19 RX ADMIN — HEPARIN SODIUM 5000 UNIT(S): 5000 INJECTION INTRAVENOUS; SUBCUTANEOUS at 06:05

## 2023-07-19 RX ADMIN — Medication 30 MILLIGRAM(S): at 18:28

## 2023-07-19 RX ADMIN — Medication 975 MILLIGRAM(S): at 06:05

## 2023-07-19 RX ADMIN — HEPARIN SODIUM 5000 UNIT(S): 5000 INJECTION INTRAVENOUS; SUBCUTANEOUS at 18:06

## 2023-07-19 RX ADMIN — Medication 975 MILLIGRAM(S): at 15:32

## 2023-07-19 RX ADMIN — Medication 30 MILLIGRAM(S): at 11:01

## 2023-07-19 RX ADMIN — SODIUM CHLORIDE 125 MILLILITER(S): 9 INJECTION, SOLUTION INTRAVENOUS at 01:00

## 2023-07-19 RX ADMIN — Medication 975 MILLIGRAM(S): at 16:20

## 2023-07-19 RX ADMIN — Medication 30 MILLIGRAM(S): at 03:25

## 2023-07-19 NOTE — PROGRESS NOTE ADULT - SUBJECTIVE AND OBJECTIVE BOX
OB Progress Note:  Delivery, POD#1    S: 31yo POD#1 s/p LTCS for cat2 tracing . Her pain is well controlled. She has not tried to void yet. She has not tried eating yet but does not have n/v. Has not been ambulating yet but will try later this morning with help. No headache, chest pain, shortness of breath, dizziness.     O:   Vital Signs Last 24 Hrs  T(C): 36.6 (2023 05:47), Max: 37.3 (2023 18:00)  T(F): 97.9 (2023 05:47), Max: 99.14 (2023 18:00)  HR: 59 (2023 05:47) (47 - 87)  BP: 109/70 (2023 05:47) (93/52 - 165/74)  BP(mean): 82 (2023 22:30) (67 - 86)  RR: 17 (2023 05:47) (12 - 22)  SpO2: 98% (2023 05:47) (96% - 100%)        Labs:  Blood type: O Positive  Rubella IgG: RPR: Negative                          10.8<L>   -- >-----------< 151    (  @ 06:27 )             31.4<L>                        11.7   11.51<H> >-----------< 186    (  @ 06:15 )             33.9<L>                  PE:  General: NAD  Abdomen: Mildly distended, appropriately tender, incision c/d/i. Removed bandage today.  Extremities: No erythema, no pitting edema   OB Progress Note:  Delivery, POD#1    S: 33yo POD#1 s/p pLTCS for cat2 tracing . Her pain is well controlled. She has not tried to void yet. She has not tried eating yet but does not have n/v. Has not been ambulating yet but will try later this morning with help. No headache, chest pain, shortness of breath, dizziness.     O:   Vital Signs Last 24 Hrs  T(C): 36.6 (2023 05:47), Max: 37.3 (2023 18:00)  T(F): 97.9 (2023 05:47), Max: 99.14 (2023 18:00)  HR: 59 (2023 05:47) (47 - 87)  BP: 109/70 (2023 05:47) (93/52 - 165/74)  BP(mean): 82 (2023 22:30) (67 - 86)  RR: 17 (2023 05:47) (12 - 22)  SpO2: 98% (2023 05:47) (96% - 100%)        Labs:  Blood type: O Positive  Rubella IgG: RPR: Negative                          10.8<L>   -- >-----------< 151    (  @ 06:27 )             31.4<L>                        11.7   11.51<H> >-----------< 186    (  @ 06:15 )             33.9<L>                  PE:  General: NAD  Abdomen: Mildly distended, appropriately tender, incision c/d/i. Removed bandage today.  Extremities: No erythema, no pitting edema

## 2023-07-19 NOTE — PROGRESS NOTE ADULT - ASSESSMENT
A/P: 31yo POD#1 s/p LTCS for Cat2 tracing.  Patient's vitals are stable and she is doing well post-operatively.      #postpartum  - Continue regular diet.  - Increase ambulation.  - Lima catheter in place  - HSQ, venodynes for DVT prophylaxis  - Continue motrin, tylenol, oxycodone PRN for pain control.   - AM CBC - hematocrit stable at 31.4, patient feeling well    Randall More MD PGY1   A/P: 31yo POD#1 s/p pLTCS for Cat2 tracing.  Patient's vitals are stable and she is doing well post-operatively.      #postpartum  - Continue regular diet.  - Increase ambulation.  - Lima catheter in place  - HSQ, venodynes for DVT prophylaxis  - Continue motrin, tylenol, oxycodone PRN for pain control.   - AM CBC - hematocrit stable at 31.4, patient feeling well    Randall More MD PGY1

## 2023-07-19 NOTE — DISCHARGE NOTE OB - TEXT 3
wash incision daily, thoroughly and directly with soap and water. Peel Steristrips off after 2 weeks.

## 2023-07-19 NOTE — DISCHARGE NOTE OB - MATERIALS PROVIDED
Vaccinations/[place]  Screening Program/  Immunization Record/Guide to Postpartum Care/Back To Sleep Handout/Shaken Baby Prevention Handout/Birth Certificate Instructions/Discharge Medication Information for Patients and Families Pocket Guide

## 2023-07-19 NOTE — DISCHARGE NOTE OB - USE THE FOOD PYRAMID (LOCATED IN DISCHARGE MATERIALS PROVIDED) AS A GUIDE TO BALANCE AND MODERATION
May 15, 2018    Rusty QUEEN May  [address]  [facility] [address]    Jun Ojeda,    This letter is to advise you of some lab work that needs to be obtained for baseline with starting on Ocrevus. The correct Hepatitis B screening labs were not completed back in April. The lab orders are in your chart. Please let us know if you have any questions, otherwise, please feel free to go to any [facility] location to have those drawn.    Sincerely,    MD Dinora Clark, MS RN Care Coordinator  [facility]  [facility]  [address] [place] [id number]  Phone [phone]  Fax [phone]     Statement Selected

## 2023-07-19 NOTE — PROGRESS NOTE ADULT - SUBJECTIVE AND OBJECTIVE BOX
POST OP DAY  1  s/p   SECTION    SUBJECTIVE:  I'm ok.    PAIN SCALE SCORE: [x] Refer to charted pain scores    THERAPY:  [  ] Spinal morphine   [ x ] Epidural morphine   [  ] IV PCA Hydromorphone 1 mg/ml    OBJECTIVE:  Comfortable Appearing    SEDATION SCORE:	  [ x ] Alert	    [  ] Drowsy        [  ] Arousable	[  ] Asleep	[  ] Unresponsive    Side Effects:	  [ x ] None	     [  ] Nausea        [  ] Pruritus        [  ] Weakness   [  ] Numbness        ASSESSMENT/ PLAN   [   ] Discontinue         [  ] Continue    [ x ] Change to prn Analgesics as per primary service.    DOCUMENTATION & VERIFICATION OF CURRENT MEDS [ x ] Done    COMMENTS: No Headache.  no anesthesia complications

## 2023-07-19 NOTE — DISCHARGE NOTE OB - HOSPITAL COURSE
Patient admitted for induction of labor. She had a  section for category 2 fetal heart rate tracing. Live male infant. Postpartum course was uncomplicated.

## 2023-07-19 NOTE — PROGRESS NOTE ADULT - ATTENDING COMMENTS
Pt seen and evaluated at bedside  Agree with above  POD 1 s/p pLTCS for cat 2 remote from delivery  Pain controlled, tolerating reg diet, bingham removed  Continue postpartum care    kemal VALDOVINOS

## 2023-07-19 NOTE — DISCHARGE NOTE OB - PATIENT PORTAL LINK FT
You can access the FollowMyHealth Patient Portal offered by [facility] by registering at the following website: [email or web address]. By joining [employer]’s FollowMyHealth portal, you will also be able to view your health information using other applications (apps) compatible with our system.

## 2023-07-19 NOTE — DISCHARGE NOTE OB - CARE PROVIDER_API CALL
Cassidy Bajwa  Obstetrics and Gynecology  [address] [place] [address]  Phone: [phone]  Fax: [phone]  Follow Up Time:

## 2023-07-19 NOTE — DISCHARGE NOTE OB - CARE PLAN
1 Principal Discharge DX:	Single delivery by  section  Assessment and plan of treatment:	Return to normal activities of daily living

## 2023-07-19 NOTE — DISCHARGE NOTE OB - MEDICATION SUMMARY - MEDICATIONS TO TAKE
I will START or STAY ON the medications listed below when I get home from the hospital:    ibuprofen 600 mg oral tablet  -- 1 tab(s) by mouth every 6 hours  -- Indication: For Pain   I will START or STAY ON the medications listed below when I get home from the hospital:    ibuprofen 600 mg oral tablet  -- 1 tab(s) by mouth every 6 hours as needed for  moderate pain  -- Indication: For Pain    acetaminophen 325 mg oral tablet  -- 3 tab(s) by mouth every 6 hours as needed for  mild pain  -- Indication: For pain

## 2023-07-20 LAB
BASOPHILS # BLD AUTO: 0.03 K/UL — SIGNIFICANT CHANGE UP (ref 0–0.2)
BASOPHILS NFR BLD AUTO: 0.2 % — SIGNIFICANT CHANGE UP (ref 0–2)
EOSINOPHIL # BLD AUTO: 0.37 K/UL — SIGNIFICANT CHANGE UP (ref 0–0.5)
EOSINOPHIL NFR BLD AUTO: 3.1 % — SIGNIFICANT CHANGE UP (ref 0–6)
HCT VFR BLD CALC: 29.1 % — LOW (ref 34.5–45)
HGB BLD-MCNC: 9.8 G/DL — LOW (ref 11.5–15.5)
IANC: 8.44 K/UL — HIGH (ref 1.8–7.4)
IMM GRANULOCYTES NFR BLD AUTO: 0.7 % — SIGNIFICANT CHANGE UP (ref 0–0.9)
LYMPHOCYTES # BLD AUTO: 18.5 % — SIGNIFICANT CHANGE UP (ref 13–44)
LYMPHOCYTES # BLD AUTO: 2.22 K/UL — SIGNIFICANT CHANGE UP (ref 1–3.3)
MCHC RBC-ENTMCNC: 33 PG — SIGNIFICANT CHANGE UP (ref 27–34)
MCHC RBC-ENTMCNC: 33.7 GM/DL — SIGNIFICANT CHANGE UP (ref 32–36)
MCV RBC AUTO: 98 FL — SIGNIFICANT CHANGE UP (ref 80–100)
MONOCYTES # BLD AUTO: 0.86 K/UL — SIGNIFICANT CHANGE UP (ref 0–0.9)
MONOCYTES NFR BLD AUTO: 7.2 % — SIGNIFICANT CHANGE UP (ref 2–14)
NEUTROPHILS # BLD AUTO: 8.44 K/UL — HIGH (ref 1.8–7.4)
NEUTROPHILS NFR BLD AUTO: 70.3 % — SIGNIFICANT CHANGE UP (ref 43–77)
NRBC # BLD: 0 /100 WBCS — SIGNIFICANT CHANGE UP (ref 0–0)
NRBC # FLD: 0 K/UL — SIGNIFICANT CHANGE UP (ref 0–0)
PLATELET # BLD AUTO: 137 K/UL — LOW (ref 150–400)
RBC # BLD: 2.97 M/UL — LOW (ref 3.8–5.2)
RBC # FLD: 12.7 % — SIGNIFICANT CHANGE UP (ref 10.3–14.5)
WBC # BLD: 12.01 K/UL — HIGH (ref 3.8–10.5)
WBC # FLD AUTO: 12.01 K/UL — HIGH (ref 3.8–10.5)

## 2023-07-20 RX ADMIN — SIMETHICONE 80 MILLIGRAM(S): 80 TABLET, CHEWABLE ORAL at 17:47

## 2023-07-20 RX ADMIN — Medication 600 MILLIGRAM(S): at 23:15

## 2023-07-20 RX ADMIN — HEPARIN SODIUM 5000 UNIT(S): 5000 INJECTION INTRAVENOUS; SUBCUTANEOUS at 06:04

## 2023-07-20 RX ADMIN — Medication 600 MILLIGRAM(S): at 13:28

## 2023-07-20 RX ADMIN — Medication 600 MILLIGRAM(S): at 01:51

## 2023-07-20 RX ADMIN — Medication 600 MILLIGRAM(S): at 17:47

## 2023-07-20 RX ADMIN — HEPARIN SODIUM 5000 UNIT(S): 5000 INJECTION INTRAVENOUS; SUBCUTANEOUS at 17:47

## 2023-07-20 RX ADMIN — Medication 600 MILLIGRAM(S): at 00:51

## 2023-07-20 RX ADMIN — Medication 600 MILLIGRAM(S): at 18:30

## 2023-07-20 RX ADMIN — Medication 600 MILLIGRAM(S): at 12:53

## 2023-07-20 RX ADMIN — Medication 600 MILLIGRAM(S): at 06:04

## 2023-07-20 NOTE — PROGRESS NOTE ADULT - SUBJECTIVE AND OBJECTIVE BOX
SUBJECTIVE:    Pain: Controlled    Complaints: None    MILESTONES:    Alert and Oriented x 3  [ x ]  Out of bed/ ambulating. [ x ]  Flatus:   Positive [ x ]  Negative [  ]  Bowel movement  [ X ] Positive [  ] Negative   Voiding [x  ] Due to void [  ]   Lima/Indwelling catheter in place [  ]  Diet: Regular [ x ]  Clears [  ]  NPO [  ]    Infant feeding:  Breast [  ]   Bottle [  ]  Both [  ]  Feeding related issues and/or concerns:      OBJECTIVE:  T(C): 36.7 (23 @ 06:25), Max: 36.7 (23 @ 10:02)  HR: 71 (23 @ 06:25) (62 - 71)  BP: 130/66 (23 @ 06:25) (101/61 - 130/66)  RR: 17 (23 @ 06:25) (16 - 18)  SpO2: 98% (23 @ 06:25) (97% - 100%)  Wt(kg): --                        9.8    12.01 )-----------( 137      ( 2023 06:05 )             29.1           Blood Type: O Positive    RPR: Negative          MEDICATIONS  (STANDING):  acetaminophen     Tablet .. 975 milliGRAM(s) Oral <User Schedule>  diphtheria/tetanus/pertussis (acellular) Vaccine (Adacel) 0.5 milliLiter(s) IntraMuscular once  heparin   Injectable 5000 Unit(s) SubCutaneous every 12 hours  ibuprofen  Tablet. 600 milliGRAM(s) Oral every 6 hours    MEDICATIONS  (PRN):  diphenhydrAMINE 25 milliGRAM(s) Oral every 6 hours PRN Pruritus  lanolin Ointment 1 Application(s) Topical every 6 hours PRN Sore Nipples  magnesium hydroxide Suspension 30 milliLiter(s) Oral two times a day PRN Constipation  oxyCODONE    IR 5 milliGRAM(s) Oral every 3 hours PRN Moderate to Severe Pain (4-10)  oxyCODONE    IR 5 milliGRAM(s) Oral once PRN Moderate to Severe Pain (4-10)  simethicone 80 milliGRAM(s) Chew every 4 hours PRN Gas        ASSESSMENT:    32y     G  1    P   1001      PO Day#  2        Delivery: Primary [ X ]    Repeat [  ]      QBL - 254                                   Indication of procedure: Abnormal Fetal Status    Condition: Stable    Past Medical History significant for: HPI:    R1 H&P    Pt is a 33y/o  at 38w2d admitted for IOL for fetal anomaly. Patient has fetal movement, contractions, no leakage of fluid or vaginal bleeding.   Prenatal course: fetal anomaly (L sided diaphragmatic hernia with dextropostion of the fetal heart and fetal stomach and portion of the liver seen in L hemithorax), no hospitalizations or ED visits    GBS: neg  EFW: 2722g    OBHx: Denies   GynHx: denies h/o abnormal paps, STI's, fibroids, cysts  PMHx: childhood asthma   PSHx: Denies  Med: PNVs  All: NKDA  SH: denies alcohol, tobacco, or drug use  Psych: denies h/o anxiety or depression     (2023 07:44)      Current Issues:    Breasts:  Soft [x  ]   Engorged [  ]  Nipples:  Abdomen: Soft [ x ]   Distended [  ] Nontender [  ]     Bowel sounds :  Present [  ]  Absent [  ]   Fundus:  Firm [x  ]  Boggy [  ]    Abdominal incision: Clean, Dry and Intact [x  ]  Staples [  ] Steri Strips [ X ] Dermabond [  ] Sutures [  ]    Patient wearing abdominal binder for support.    Vaginal: Lochia:  Heavy [  ]  Moderate [ x ]   Scant [  ]    Extremities: Edema [  ] Negative Tanya's Sign [ X ] Nontender Shailesh  [ x ] Positive pedal pulses [  ]    Other relevant physical exam findings:      PLAN:    Plan: Increase ambulation, analgesia PRN and pain medication protocol standing oxycodone, ibuprofen and acetaminophen.    Diet: Regular diet    Continue routine post-operative and postpartum care.  WBC in a downtrend. ( 19.9 --> 12.0 )    Discharge Planning [ x ]    For Discharge Today  [    ]    Consults:  Social Work [ X ]  Lactation [ x ]  Other [         ]

## 2023-07-21 RX ADMIN — Medication 600 MILLIGRAM(S): at 11:34

## 2023-07-21 RX ADMIN — Medication 975 MILLIGRAM(S): at 14:56

## 2023-07-21 RX ADMIN — Medication 600 MILLIGRAM(S): at 12:42

## 2023-07-21 RX ADMIN — HEPARIN SODIUM 5000 UNIT(S): 5000 INJECTION INTRAVENOUS; SUBCUTANEOUS at 20:18

## 2023-07-21 RX ADMIN — Medication 975 MILLIGRAM(S): at 08:06

## 2023-07-21 RX ADMIN — Medication 600 MILLIGRAM(S): at 21:00

## 2023-07-21 RX ADMIN — Medication 600 MILLIGRAM(S): at 20:18

## 2023-07-21 RX ADMIN — Medication 975 MILLIGRAM(S): at 09:30

## 2023-07-21 RX ADMIN — Medication 600 MILLIGRAM(S): at 05:39

## 2023-07-21 RX ADMIN — HEPARIN SODIUM 5000 UNIT(S): 5000 INJECTION INTRAVENOUS; SUBCUTANEOUS at 05:38

## 2023-07-21 RX ADMIN — Medication 600 MILLIGRAM(S): at 00:15

## 2023-07-21 RX ADMIN — Medication 975 MILLIGRAM(S): at 16:00

## 2023-07-21 NOTE — PROGRESS NOTE ADULT - SUBJECTIVE AND OBJECTIVE BOX
Postpartum Note,  Section  She is a  32y woman who is now post-operative day #3:     Subjective:  The patient feels well.  She is ambulating.   She is tolerating regular diet.  She denies nausea and vomiting.  She is voiding.  Her pain is controlled.  She reports normal postpartum bleeding.  She is breastfeeding/pumping    Physical exam:    Vital Signs Last 24 Hrs  T(C): 36.7 (2023 08:15), Max: 36.8 (2023 13:56)  T(F): 98.1 (2023 08:15), Max: 98.2 (2023 13:56)  HR: 62 (2023 08:15) (62 - 70)  BP: 126/78 (2023 08:15) (112/78 - 126/78)  BP(mean): --  RR: 16 (2023 08:15) (16 - 18)  SpO2: 99% (2023 08:15) (99% - 100%)    Parameters below as of 2023 06:00  Patient On (Oxygen Delivery Method): room air          Gen: NAD  Breast: Soft, nontender, not engorged.  Abdomen: Soft, nontender, no distension , firm uterine fundus at umbilicus.  Incision: Clean, dry, and intact with steri strips  Pelvic: Normal lochia noted  Ext: No calf tenderness    LABS:                        9.8    12.01 )-----------( 137      ( 2023 06:05 )             29.1       Rubella status:     Allergies    No Known Allergies    Intolerances      MEDICATIONS  (STANDING):  acetaminophen     Tablet .. 975 milliGRAM(s) Oral <User Schedule>  diphtheria/tetanus/pertussis (acellular) Vaccine (Adacel) 0.5 milliLiter(s) IntraMuscular once  heparin   Injectable 5000 Unit(s) SubCutaneous every 12 hours  ibuprofen  Tablet. 600 milliGRAM(s) Oral every 6 hours    MEDICATIONS  (PRN):  diphenhydrAMINE 25 milliGRAM(s) Oral every 6 hours PRN Pruritus  lanolin Ointment 1 Application(s) Topical every 6 hours PRN Sore Nipples  magnesium hydroxide Suspension 30 milliLiter(s) Oral two times a day PRN Constipation  oxyCODONE    IR 5 milliGRAM(s) Oral every 3 hours PRN Moderate to Severe Pain (4-10)  oxyCODONE    IR 5 milliGRAM(s) Oral once PRN Moderate to Severe Pain (4-10)  simethicone 80 milliGRAM(s) Chew every 4 hours PRN Gas        Assessment and Plan  POD #3 s/p  section  Doing well.  Anticipate d/c in am of

## 2023-07-22 VITALS
TEMPERATURE: 98 F | OXYGEN SATURATION: 100 % | DIASTOLIC BLOOD PRESSURE: 69 MMHG | RESPIRATION RATE: 17 BRPM | SYSTOLIC BLOOD PRESSURE: 119 MMHG | HEART RATE: 74 BPM

## 2023-07-22 RX ORDER — ACETAMINOPHEN 500 MG
3 TABLET ORAL
Qty: 0 | Refills: 0 | DISCHARGE
Start: 2023-07-22

## 2023-07-22 RX ADMIN — Medication 600 MILLIGRAM(S): at 12:46

## 2023-07-22 RX ADMIN — Medication 975 MILLIGRAM(S): at 11:03

## 2023-07-22 RX ADMIN — Medication 975 MILLIGRAM(S): at 12:43

## 2023-07-22 RX ADMIN — Medication 600 MILLIGRAM(S): at 07:40

## 2023-07-22 RX ADMIN — Medication 975 MILLIGRAM(S): at 16:29

## 2023-07-22 RX ADMIN — HEPARIN SODIUM 5000 UNIT(S): 5000 INJECTION INTRAVENOUS; SUBCUTANEOUS at 11:03

## 2023-07-22 RX ADMIN — Medication 600 MILLIGRAM(S): at 05:41

## 2023-07-22 RX ADMIN — Medication 600 MILLIGRAM(S): at 13:59

## 2023-07-22 NOTE — PROGRESS NOTE ADULT - SUBJECTIVE AND OBJECTIVE BOX
OB Progress Note: pTLCS, POD#2    S: 31yo POD#2 s/p pLTCS. Pain is well controlled. She is tolerating a regular diet and passing flatus. She is voiding spontaneously, and ambulating without difficulty. Denies CP/SOB. Denies lightheadedness/dizziness. Denies N/V.    O:  Vitals:  Vital Signs Last 24 Hrs  T(C): 36.7 (22 Jul 2023 06:42), Max: 36.9 (21 Jul 2023 13:42)  T(F): 98 (22 Jul 2023 06:42), Max: 98.5 (21 Jul 2023 13:42)  HR: 74 (22 Jul 2023 06:42) (69 - 74)  BP: 119/69 (22 Jul 2023 06:42) (112/66 - 119/69)  BP(mean): --  RR: 17 (22 Jul 2023 06:42) (17 - 18)  SpO2: 100% (22 Jul 2023 06:42) (99% - 100%)    Parameters below as of 22 Jul 2023 06:42  Patient On (Oxygen Delivery Method): room air        MEDICATIONS  (STANDING):  acetaminophen     Tablet .. 975 milliGRAM(s) Oral <User Schedule>  diphtheria/tetanus/pertussis (acellular) Vaccine (Adacel) 0.5 milliLiter(s) IntraMuscular once  heparin   Injectable 5000 Unit(s) SubCutaneous every 12 hours  ibuprofen  Tablet. 600 milliGRAM(s) Oral every 6 hours      MEDICATIONS  (PRN):  diphenhydrAMINE 25 milliGRAM(s) Oral every 6 hours PRN Pruritus  lanolin Ointment 1 Application(s) Topical every 6 hours PRN Sore Nipples  magnesium hydroxide Suspension 30 milliLiter(s) Oral two times a day PRN Constipation  oxyCODONE    IR 5 milliGRAM(s) Oral every 3 hours PRN Moderate to Severe Pain (4-10)  oxyCODONE    IR 5 milliGRAM(s) Oral once PRN Moderate to Severe Pain (4-10)  simethicone 80 milliGRAM(s) Chew every 4 hours PRN Gas      Labs:  Blood type: O Positive  Rubella IgG: RPR: Negative                          9.8<L>   12.01<H> >-----------< 137<L>    ( 07-20 @ 06:05 )             29.1<L>          PE:  General: NAD  Abdomen: Soft, appropriately tender, incision c/d/i.  Extremities: No erythema, no pitting edema    A/P: 31yo  POD#2 s/p pLTCS.  Patient is stable and doing well post-operatively.    - Continue regular diet.  - Increase ambulation.  - Continue motrin, tylenol, oxycodone PRN for pain control.  -Reviewed pain management regiment  -Bleeding precautions  -Reviewed incisional care  --soap and water  --keep area clean and dry   -return to OB in 2 weeks for incisional check   -Reviewed signs and symptoms related to infecton   -Nothing per vagina x6 weeks   -Report to OB in 6 weeks or sooner if indicated     DONAVAN Carpenter NP

## 2023-07-24 PROBLEM — J45.909 UNSPECIFIED ASTHMA, UNCOMPLICATED: Chronic | Status: ACTIVE | Noted: 2023-07-17

## 2023-08-01 ENCOUNTER — APPOINTMENT (OUTPATIENT)
Dept: OBGYN | Facility: CLINIC | Age: 33
End: 2023-08-01

## 2023-08-29 LAB
GP B STREP DNA SPEC QL NAA+PROBE: NOT DETECTED
HIV1+2 AB SPEC QL IA.RAPID: NONREACTIVE
SOURCE GBS: NORMAL

## 2023-09-14 ENCOUNTER — APPOINTMENT (OUTPATIENT)
Dept: OBGYN | Facility: CLINIC | Age: 33
End: 2023-09-14
Payer: COMMERCIAL

## 2023-09-14 VITALS
HEIGHT: 64 IN | HEART RATE: 67 BPM | SYSTOLIC BLOOD PRESSURE: 120 MMHG | WEIGHT: 141 LBS | DIASTOLIC BLOOD PRESSURE: 83 MMHG | BODY MASS INDEX: 24.07 KG/M2

## 2023-09-14 PROCEDURE — 0503F POSTPARTUM CARE VISIT: CPT

## 2023-09-14 RX ORDER — NORETHINDRONE 0.35 MG/1
0.35 TABLET ORAL DAILY
Qty: 3 | Refills: 1 | Status: ACTIVE | COMMUNITY
Start: 2023-09-14 | End: 1900-01-01

## 2023-09-16 LAB
HCG UR QL: NEGATIVE
QUALITY CONTROL: YES

## 2024-02-19 NOTE — DISCHARGE NOTE OB - BREASTFEEDING PROVIDES MATERNAL HEALTH BENEFITS, DECREASED PREMENOPAUSAL BREAST CANCER, OVARIAN CANCER AND TYPE II DIABETES MELLITUS
PRE-OP EVALUATION    Patient Name: Luis Daniel Headley    Admit Diagnosis: Hemorrhoids, unspecified hemorrhoid type [K64.9]    Pre-op Diagnosis: Hemorrhoids, unspecified hemorrhoid type [K64.9]    EXCISIONAL HEMORRHOIDECTOMY    Anesthesia Procedure: EXCISIONAL HEMORRHOIDECTOMY (Anus)    Surgeon(s) and Role:     * Vanessa Gomez MD - Primary    Pre-op vitals reviewed.  Temp: 97.9 °F (36.6 °C)  Pulse: 66  Resp: 16  BP: 116/76  SpO2: 97 %  Body mass index is 28.55 kg/m².    Current medications reviewed.  Hospital Medications:   [MAR Hold] acetaminophen (Tylenol Extra Strength) tab 1,000 mg  1,000 mg Oral Once    scopolamine (Transderm-Scop) 1 MG/3DAYS patch 1 patch  1 patch Transdermal Once    lactated ringers infusion   Intravenous Continuous    [COMPLETED] heparin (Porcine) 5000 UNIT/ML injection 5,000 Units  5,000 Units Subcutaneous Once    ceFAZolin (Ancef) 2 g in 20mL IV syringe premix  2 g Intravenous Once    And    [COMPLETED] metRONIDAZOLE in sodium chloride 0.79% (Flagyl) 5 mg/mL IVPB premix 500 mg  500 mg Intravenous Once       Outpatient Medications:     No medications prior to admission.       Allergies: Patient has no known allergies.      Anesthesia Evaluation    Patient summary reviewed.    Anesthetic Complications  (+) history of anesthetic complications         GI/Hepatic/Renal      (+) GERD                           Cardiovascular    Negative cardiovascular ROS.                                                   Endo/Other    Negative endo/other ROS.                              Pulmonary    Negative pulmonary ROS.                       Neuro/Psych    Negative neuro/psych ROS.                                  Past Surgical History:   Procedure Laterality Date    COLONOSCOPY  01/31/2022    Last one    COLONOSCOPY N/A 01/25/2023    Procedure: COLONOSCOPY with Hemorrhoid BANDING X3, ESOPHAGOGASTRODUODENOSCOPY (EGD) WITH BIOPSIES;  Surgeon: Chrsi Hernandez DO;  Location:  ENDOSCOPY    COLONOSCOPY   06/2023    flex sig w hemorrhoid banding     Social History     Socioeconomic History    Marital status:    Tobacco Use    Smoking status: Some Days     Types: Cigarettes    Smokeless tobacco: Never   Vaping Use    Vaping Use: Never used   Substance and Sexual Activity    Alcohol use: Yes     Alcohol/week: 3.0 standard drinks of alcohol     Types: 3 Cans of beer per week     Comment: occasionally    Drug use: Yes     Frequency: 35.0 times per week     Types: Cannabis     Comment: Daily use/ flower   Other Topics Concern    Caffeine Concern Yes     Comment: 1 cup coffee daily    Exercise Yes     Comment: at least 1x per week, active at work     History   Drug Use    Frequency: 35.0 times per week    Types: Cannabis     Comment: Daily use/ flower     Available pre-op labs reviewed.  Lab Results   Component Value Date    WBC 6.6 02/09/2024    RBC 5.06 02/09/2024    HGB 12.9 (L) 02/09/2024    HCT 40.5 02/09/2024    MCV 80.0 02/09/2024    MCH 25.5 (L) 02/09/2024    MCHC 31.9 02/09/2024    RDW 19.7 02/09/2024    .0 02/09/2024               Airway      Mallampati: I  Mouth opening: 3 FB  TM distance: 4 - 6 cm  Neck ROM: full Cardiovascular    Cardiovascular exam normal.         Dental    Dentition appears grossly intact         Pulmonary    Pulmonary exam normal.                 Other findings              ASA: 2   Plan: general  NPO status verified and patient meets guidelines.    Post-procedure pain management plan discussed with surgeon and patient.      Plan/risks discussed with: patient and spouse                Present on Admission:  **None**         Statement Selected

## 2024-03-31 PROBLEM — Q79.0: Status: ACTIVE | Noted: 2023-03-15

## 2024-04-03 NOTE — PROCEDURAL SAFETY CHECKLIST WITH OR WITHOUT SEDATION - NSPOSTPXDISPO3SD_GEN_ALL_CORE

## 2024-05-02 NOTE — PROCEDURAL SAFETY CHECKLIST WITH OR WITHOUT SEDATION - NSPX2BRECORDED_GEN_ALL_CORE
Advance Care Planning     Advance Care Planning Clinical Specialist  Conversation Note      Date of ACP Conversation: 5/2/2024    Conversation Conducted with: Patient with Decision Making Capacity    ACP Clinical Specialist: DEVIN Farrar  Healthcare Decision Maker:     Current Designated Healthcare Decision Maker:     Primary Decision Maker: Odilon Sampson - [facility] - [phone]  Click here to complete Healthcare Decision Makers including section of the Healthcare Decision Maker Relationship (ie \"Primary\")  Today we completed the ACP conversation, nomination of HCDM's and completed the AD documents.     Care Preferences  Ventilation:  \"If you were in your present state of health and suddenly became very ill and were unable to breathe on your own, what would your preference be about the use of a ventilator (breathing machine) if it were available to you?\"      If the patient would desire the use of ventilator (breathing machine), answer \"yes\".  If not, \"no\": no    \"If your health worsens and it becomes clear that your chance of recovery is unlikely, what would your preference be about the use of a ventilator (breathing machine) if it were available to you?\"     Would the patient desire the use of ventilator (breathing machine)?: No      Resuscitation  \"CPR works best to restart the heart when there is a sudden event, like a heart attack, in someone who is otherwise healthy. Unfortunately, CPR does not typically restart the heart for people who have serious health conditions or who are very sick.\"    \"In the event your heart stopped as a result of an underlying serious health condition, would you want attempts to be made to restart your heart (answer \"yes\" for attempt to resuscitate) or would you prefer a natural death (answer \"no\" for do not attempt to resuscitate)?\" no       [x] Yes   [] No   Educated Patient / Decision Maker regarding differences between Advance Directives and portable DNR orders.    Length of 
Primary

## 2024-07-31 ENCOUNTER — APPOINTMENT (OUTPATIENT)
Dept: MATERNAL FETAL MEDICINE | Facility: CLINIC | Age: 34
End: 2024-07-31
Payer: COMMERCIAL

## 2024-07-31 ENCOUNTER — ASOB RESULT (OUTPATIENT)
Age: 34
End: 2024-07-31

## 2024-07-31 PROCEDURE — 99212 OFFICE O/P EST SF 10 MIN: CPT | Mod: 95

## 2024-08-14 ENCOUNTER — ASOB RESULT (OUTPATIENT)
Age: 34
End: 2024-08-14

## 2024-08-14 ENCOUNTER — APPOINTMENT (OUTPATIENT)
Dept: ANTEPARTUM | Facility: CLINIC | Age: 34
End: 2024-08-14
Payer: COMMERCIAL

## 2024-08-14 DIAGNOSIS — O35.9XX0 MATERNAL CARE FOR (SUSPECTED) FETAL ABNORMALITY AND DAMAGE, UNSPECIFIED, NOT APPLICABLE OR UNSPECIFIED: ICD-10-CM

## 2024-08-14 PROCEDURE — 36416 COLLJ CAPILLARY BLOOD SPEC: CPT

## 2024-08-14 PROCEDURE — 76813 OB US NUCHAL MEAS 1 GEST: CPT

## 2024-08-20 LAB
ADDITIONAL US: NORMAL
COMMENTS: AFP: NORMAL
CRL SCAN TWIN B: NORMAL
CRL SCAN: NORMAL
CROWN RUMP LENGTH TWIN B: NORMAL
CROWN RUMP LENGTH: 59.7 MM
DOWN SYNDROME AGE RISK: NORMAL
DOWN SYNDROME INTERPRETATION: NORMAL
DOWN SYNDROME SCREENING RISK: NORMAL
GEST. AGE ON COLLECTION DATE: 12.3 WEEKS
HCG MOM: 0.48
HCG VALUE: 43.3 IU/ML
MATERNAL AGE AT EDD: 35.2 YR
NOTE: AFP: NORMAL
NT MOM TWIN B: NORMAL
NT TWIN B: NORMAL
NUCHAL TRANSLUCENCY (NT): 1.8 MM
NUCHAL TRANSLUCENCY MOM: 1.17
NUMBER OF FETUSES: 1
PAPP-A MOM: 0.39
PAPP-A VALUE: 368.7 NG/ML
RACE: NORMAL
RESULTS AFP: NORMAL
SONOGRAPHER ID#: NORMAL
SUBMIT PART 2 SAMPLE USING: NORMAL
TEST RESULTS: AFP: NORMAL
TRISOMY 18 AGE RISK: NORMAL
TRISOMY 18 INTERPRETATION: NORMAL
TRISOMY 18 SCREENING RISK: NORMAL
WEIGHT AFP: 146 LBS

## 2024-09-11 DIAGNOSIS — N92.0 EXCESSIVE AND FREQUENT MENSTRUATION WITH REGULAR CYCLE: ICD-10-CM

## 2024-09-11 DIAGNOSIS — Z34.03 ENCOUNTER FOR SUPERVISION OF NORMAL FIRST PREGNANCY, THIRD TRIMESTER: ICD-10-CM

## 2024-09-11 DIAGNOSIS — Z87.898 PERSONAL HISTORY OF OTHER SPECIFIED CONDITIONS: ICD-10-CM

## 2024-09-11 DIAGNOSIS — Z3A.28 28 WEEKS GESTATION OF PREGNANCY: ICD-10-CM

## 2024-09-11 DIAGNOSIS — O35.9XX0 MATERNAL CARE FOR (SUSPECTED) FETAL ABNORMALITY AND DAMAGE, UNSPECIFIED, NOT APPLICABLE OR UNSPECIFIED: ICD-10-CM

## 2024-09-11 DIAGNOSIS — R82.90 UNSPECIFIED ABNORMAL FINDINGS IN URINE: ICD-10-CM

## 2024-09-11 DIAGNOSIS — R92.8 OTHER ABNORMAL AND INCONCLUSIVE FINDINGS ON DIAGNOSTIC IMAGING OF BREAST: ICD-10-CM

## 2024-09-11 DIAGNOSIS — Z87.760: ICD-10-CM

## 2024-09-11 DIAGNOSIS — N64.4 MASTODYNIA: ICD-10-CM

## 2024-09-11 DIAGNOSIS — O35.FXX0: ICD-10-CM

## 2024-09-11 DIAGNOSIS — Z11.3 ENCOUNTER FOR SCREENING FOR INFECTIONS WITH A PREDOMINANTLY SEXUAL MODE OF TRANSMISSION: ICD-10-CM

## 2024-09-11 DIAGNOSIS — Z3A.21 21 WEEKS GESTATION OF PREGNANCY: ICD-10-CM

## 2024-09-11 DIAGNOSIS — Z01.419 ENCOUNTER FOR GYNECOLOGICAL EXAMINATION (GENERAL) (ROUTINE) W/OUT ABNORMAL FINDINGS: ICD-10-CM

## 2024-09-18 ENCOUNTER — ASOB RESULT (OUTPATIENT)
Age: 34
End: 2024-09-18

## 2024-09-18 ENCOUNTER — APPOINTMENT (OUTPATIENT)
Dept: MATERNAL FETAL MEDICINE | Facility: CLINIC | Age: 34
End: 2024-09-18
Payer: COMMERCIAL

## 2024-09-18 ENCOUNTER — APPOINTMENT (OUTPATIENT)
Dept: ANTEPARTUM | Facility: CLINIC | Age: 34
End: 2024-09-18
Payer: COMMERCIAL

## 2024-09-18 VITALS
HEART RATE: 75 BPM | SYSTOLIC BLOOD PRESSURE: 100 MMHG | BODY MASS INDEX: 25.61 KG/M2 | DIASTOLIC BLOOD PRESSURE: 60 MMHG | HEIGHT: 64 IN | OXYGEN SATURATION: 100 % | WEIGHT: 150 LBS

## 2024-09-18 DIAGNOSIS — O09.899 ABNORMAL HEMATOLOGICAL FINDING ON ANTENATAL SCREENING OF MOTHER: ICD-10-CM

## 2024-09-18 DIAGNOSIS — O09.299 SUPERVISION OF PREGNANCY WITH OTHER POOR REPRODUCTIVE OR OBSTETRIC HISTORY, UNSPECIFIED TRIMESTER: ICD-10-CM

## 2024-09-18 DIAGNOSIS — Z98.891 HISTORY OF UTERINE SCAR FROM PREVIOUS SURGERY: ICD-10-CM

## 2024-09-18 DIAGNOSIS — Z3A.17 17 WEEKS GESTATION OF PREGNANCY: ICD-10-CM

## 2024-09-18 DIAGNOSIS — O28.0 ABNORMAL HEMATOLOGICAL FINDING ON ANTENATAL SCREENING OF MOTHER: ICD-10-CM

## 2024-09-18 DIAGNOSIS — O35.10X0 MATERNAL CARE FOR (SUSPECTED) CHROMOSOMAL ABNORMALITY IN FETUS, UNSPECIFIED, NOT APPLICABLE OR UNSPECIFIED: ICD-10-CM

## 2024-09-18 DIAGNOSIS — O09.529 SUPERVISION OF ELDERLY MULTIGRAVIDA, UNSPECIFIED TRIMESTER: ICD-10-CM

## 2024-09-18 PROCEDURE — 99214 OFFICE O/P EST MOD 30 MIN: CPT

## 2024-09-18 PROCEDURE — ZZZZZ: CPT

## 2024-09-18 PROCEDURE — 76815 OB US LIMITED FETUS(S): CPT

## 2024-09-18 PROCEDURE — 36415 COLL VENOUS BLD VENIPUNCTURE: CPT

## 2024-09-18 RX ORDER — KRILL/OM-3/DHA/EPA/PHOSPHO/AST 1000-230MG
81 CAPSULE ORAL
Qty: 90 | Refills: 5 | Status: ACTIVE | COMMUNITY
Start: 2024-09-18

## 2024-09-18 NOTE — DISCUSSION/SUMMARY
[FreeTextEntry1] : Araseli is a 33-year-old -0-0-1 being seen today at 17 weeks for advanced maternal age, Low CM-A, previous pregnancy affected by diaphragmatic hernia and history of  section.  Her obstetrical history is significant for delivery in  of a liveborn male  weighing 7 pounds delivered at 38 weeks via  section secondary to fetal concerns.  Her son was diagnosed with a diaphragmatic hernia with dextroposition of the fetal heart.  Surgical repair occurred immediately postdelivery and her son is doing well.  No other problems or complications noted with that pregnancy.  A targeted ultrasound was performed today and reveals a single viable intrauterine gestation with size consistent with dates.  No gross or soft markers associated with fetal aneuploidy were noted.  Vital signs today reveal a blood pressure 100/60 and maternal weight is 150 pounds consistent with a BMI of 25.77 kg.  Ultra-Screen evaluation was performed on  and demonstrated low risk for fetal aneuploidy.  CM-A was low at 0.39 multiples of the mean.  This abnormal analyte has been associated with increased incidence of delivery prior to 20 weeks, poor fetal growth, preeclampsia and delivery prior to 32 weeks.  She has started low-dose aspirin which alternating 1 tablet and 2 tablets.  She will continue this medication until 38 weeks.  This medication has demonstrated statistically significant prevention and/or delay in onset and severity of preeclampsia.  Signs and symptoms of preeclampsia were discussed and should be reinforced later in the pregnancy.  Comprehensive ultrasound in 3 weeks is scheduled.  A growth scan at 28 weeks and 36 weeks will be recommended.  Weekly  testing beginning at 36 weeks until delivery will also be recommended.  A fetal echo has been recommended secondary to her previous OB history.  A referral was given.  This should be performed between 20 and 22 weeks.  Genetic counseling has been performed and a report was sent under separate cover.  Blue has not received a COVID-19 vaccination.  Risks and benefits of Covid-19 vaccination in pregnancy were discussed and after all of her questions were answered she has declined vaccination in pregnancy.  She understands she has certain comorbidities that put her at increased risk for problems and/or complications should she have a COVID infection.  She was advised to call your office to discuss various treatment options should that occur.  She has a noncontributory family and medical history.  She has had a  section.  She has no known allergies to medications and denies alcohol, tobacco or drug use.  I spent a total of 34 minutes reviewing the patient's prenatal record, prenatal blood work, outside medical records, previous consultations and ultrasound ports counseling coordinating care.  Recommendations;  1.  Low-dose aspirin alternating 1 tablet and 2 tablets until 38 weeks. 2.  Comprehensive ultrasound in 3 weeks is scheduled. 3.  Fetal echo between 20 and 22 weeks.  Referral was given. 4.  Growth scan at 28 weeks and at 36 weeks. 5.  Weekly  testing beginning at 36 weeks until delivery. 6.  Follow-up maternal-fetal medicine consultation as clinically indicated.

## 2024-09-18 NOTE — CHIEF COMPLAINT
[G ___] : G [unfilled] [P ___] : P [unfilled] [de-identified] : Low CM-A, History of C/S. Previous fetus with diaphragmatic hernia.AMA.

## 2024-09-24 LAB
ADDITIONAL US: NORMAL
AFP MOM: 1.06
AFP VALUE: 39.2 NG/ML
COLLECTED ON 2: NORMAL
COLLECTED ON: NORMAL
CRL SCAN TWIN B: NORMAL
CRL SCAN: NORMAL
CROWN RUMP LENGTH TWIN B: NORMAL
CROWN RUMP LENGTH: 59.7 MM
DIA MOM: 0.89
DIA VALUE: 142.5 PG/ML
DOWN SYNDROME AGE RISK: NORMAL
DOWN SYNDROME INTERPRETATION: NORMAL
DOWN SYNDROME SCREENING RISK: NORMAL
FIRST TRIMESTER SAMPLE: NORMAL
GEST. AGE ON COLLECTION DATE: 12.3 WEEKS
GESTATIONAL AGE: 17.3 WEEKS
HCG MOM: 0.86
HCG VALUE: 24.2 IU/ML
INSULIN DEP DIABETES: NORMAL
MATERNAL AGE AT EDD: 35.2 YR
NT MOM TWIN B: NORMAL
NT TWIN B: NORMAL
NUCHAL TRANSLUCENCY (NT): 1.8 MM
NUCHAL TRANSLUCENCY MOM: 1.17
NUMBER OF FETUSES: 1
OPEN SPINA BIFIDA: NORMAL
OSB INTERPRETATION: NORMAL
PAPP-A MOM: 0.39
PAPP-A VALUE: 368.7 NG/ML
RACE: NORMAL
SECOND TRIMESTER SAMPLE: NORMAL
SEQUENTIAL 2 COMMENTS: NORMAL
SEQUENTIAL 2 NOTE: NORMAL
SEQUENTIAL 2 RESULTS: NORMAL
SEQUENTIAL 2 TEST RESULTS: NORMAL
SONOGRAPHER ID#: NORMAL
TRISOMY 18 AGE RISK: NORMAL
TRISOMY 18 INTERPRETATION: NORMAL
TRISOMY 18 SCREENING RISK: NORMAL
UE3 MOM: 0.73
UE3 VALUE: 0.81 NG/ML
WEIGHT AFP: 146 LBS
WEIGHT: 150 LBS

## 2024-10-09 ENCOUNTER — ASOB RESULT (OUTPATIENT)
Age: 34
End: 2024-10-09

## 2024-10-09 ENCOUNTER — APPOINTMENT (OUTPATIENT)
Dept: ANTEPARTUM | Facility: CLINIC | Age: 34
End: 2024-10-09
Payer: COMMERCIAL

## 2024-10-09 ENCOUNTER — APPOINTMENT (OUTPATIENT)
Dept: PEDIATRIC CARDIOLOGY | Facility: CLINIC | Age: 34
End: 2024-10-09
Payer: COMMERCIAL

## 2024-10-09 ENCOUNTER — APPOINTMENT (OUTPATIENT)
Dept: MATERNAL FETAL MEDICINE | Facility: CLINIC | Age: 34
End: 2024-10-09
Payer: COMMERCIAL

## 2024-10-09 VITALS
SYSTOLIC BLOOD PRESSURE: 110 MMHG | OXYGEN SATURATION: 100 % | DIASTOLIC BLOOD PRESSURE: 60 MMHG | BODY MASS INDEX: 25.75 KG/M2 | HEART RATE: 57 BPM | WEIGHT: 150 LBS

## 2024-10-09 DIAGNOSIS — Z98.891 HISTORY OF UTERINE SCAR FROM PREVIOUS SURGERY: ICD-10-CM

## 2024-10-09 DIAGNOSIS — O09.899 ABNORMAL HEMATOLOGICAL FINDING ON ANTENATAL SCREENING OF MOTHER: ICD-10-CM

## 2024-10-09 DIAGNOSIS — O28.0 ABNORMAL HEMATOLOGICAL FINDING ON ANTENATAL SCREENING OF MOTHER: ICD-10-CM

## 2024-10-09 DIAGNOSIS — Z3A.17 17 WEEKS GESTATION OF PREGNANCY: ICD-10-CM

## 2024-10-09 DIAGNOSIS — Z3A.20 20 WEEKS GESTATION OF PREGNANCY: ICD-10-CM

## 2024-10-09 DIAGNOSIS — O09.529 SUPERVISION OF ELDERLY MULTIGRAVIDA, UNSPECIFIED TRIMESTER: ICD-10-CM

## 2024-10-09 PROCEDURE — 99214 OFFICE O/P EST MOD 30 MIN: CPT

## 2024-10-09 PROCEDURE — ZZZZZ: CPT

## 2024-10-09 PROCEDURE — 76811 OB US DETAILED SNGL FETUS: CPT

## 2024-10-09 PROCEDURE — 76820 UMBILICAL ARTERY ECHO: CPT

## 2024-10-09 PROCEDURE — 76825 ECHO EXAM OF FETAL HEART: CPT

## 2024-10-09 PROCEDURE — 76817 TRANSVAGINAL US OBSTETRIC: CPT

## 2024-10-09 PROCEDURE — 93325 DOPPLER ECHO COLOR FLOW MAPG: CPT | Mod: 59

## 2024-10-09 PROCEDURE — 76827 ECHO EXAM OF FETAL HEART: CPT

## 2024-10-09 PROCEDURE — 99213 OFFICE O/P EST LOW 20 MIN: CPT | Mod: 25

## 2024-11-27 ENCOUNTER — APPOINTMENT (OUTPATIENT)
Dept: ANTEPARTUM | Facility: CLINIC | Age: 34
End: 2024-11-27
Payer: COMMERCIAL

## 2024-11-27 ENCOUNTER — ASOB RESULT (OUTPATIENT)
Age: 34
End: 2024-11-27

## 2024-11-27 PROCEDURE — 76816 OB US FOLLOW-UP PER FETUS: CPT

## 2024-12-26 ENCOUNTER — ASOB RESULT (OUTPATIENT)
Age: 34
End: 2024-12-26

## 2024-12-26 ENCOUNTER — APPOINTMENT (OUTPATIENT)
Dept: ANTEPARTUM | Facility: CLINIC | Age: 34
End: 2024-12-26
Payer: COMMERCIAL

## 2024-12-26 PROCEDURE — 76816 OB US FOLLOW-UP PER FETUS: CPT

## 2025-01-22 ENCOUNTER — ASOB RESULT (OUTPATIENT)
Age: 35
End: 2025-01-22

## 2025-01-22 ENCOUNTER — APPOINTMENT (OUTPATIENT)
Dept: ANTEPARTUM | Facility: CLINIC | Age: 35
End: 2025-01-22
Payer: COMMERCIAL

## 2025-01-22 PROCEDURE — 76816 OB US FOLLOW-UP PER FETUS: CPT

## 2025-01-29 ENCOUNTER — APPOINTMENT (OUTPATIENT)
Dept: ANTEPARTUM | Facility: CLINIC | Age: 35
End: 2025-01-29
Payer: COMMERCIAL

## 2025-01-29 ENCOUNTER — ASOB RESULT (OUTPATIENT)
Age: 35
End: 2025-01-29

## 2025-01-29 PROCEDURE — 76818 FETAL BIOPHYS PROFILE W/NST: CPT

## 2025-02-04 ENCOUNTER — ASOB RESULT (OUTPATIENT)
Age: 35
End: 2025-02-04

## 2025-02-04 ENCOUNTER — APPOINTMENT (OUTPATIENT)
Dept: ANTEPARTUM | Facility: CLINIC | Age: 35
End: 2025-02-04
Payer: COMMERCIAL

## 2025-02-04 PROCEDURE — 76818 FETAL BIOPHYS PROFILE W/NST: CPT

## 2025-02-11 ENCOUNTER — ASOB RESULT (OUTPATIENT)
Age: 35
End: 2025-02-11

## 2025-02-11 ENCOUNTER — APPOINTMENT (OUTPATIENT)
Dept: ANTEPARTUM | Facility: CLINIC | Age: 35
End: 2025-02-11
Payer: COMMERCIAL

## 2025-02-11 PROCEDURE — 76818 FETAL BIOPHYS PROFILE W/NST: CPT

## 2025-02-14 ENCOUNTER — INPATIENT (INPATIENT)
Facility: HOSPITAL | Age: 35
LOS: 1 days | Discharge: HOME | End: 2025-02-16
Attending: OBSTETRICS & GYNECOLOGY | Admitting: OBSTETRICS & GYNECOLOGY
Payer: COMMERCIAL

## 2025-02-14 ENCOUNTER — RESULT REVIEW (OUTPATIENT)
Age: 35
End: 2025-02-14

## 2025-02-14 VITALS
TEMPERATURE: 98 F | HEART RATE: 59 BPM | DIASTOLIC BLOOD PRESSURE: 78 MMHG | RESPIRATION RATE: 18 BRPM | SYSTOLIC BLOOD PRESSURE: 124 MMHG

## 2025-02-14 DIAGNOSIS — Z3A.00 WEEKS OF GESTATION OF PREGNANCY NOT SPECIFIED: ICD-10-CM

## 2025-02-14 DIAGNOSIS — Z98.891 HISTORY OF UTERINE SCAR FROM PREVIOUS SURGERY: Chronic | ICD-10-CM

## 2025-02-14 DIAGNOSIS — O26.899 OTHER SPECIFIED PREGNANCY RELATED CONDITIONS, UNSPECIFIED TRIMESTER: ICD-10-CM

## 2025-02-14 LAB
BASOPHILS # BLD AUTO: 0.04 K/UL — SIGNIFICANT CHANGE UP (ref 0–0.2)
BASOPHILS NFR BLD AUTO: 0.4 % — SIGNIFICANT CHANGE UP (ref 0–2)
EOSINOPHIL # BLD AUTO: 0.45 K/UL — SIGNIFICANT CHANGE UP (ref 0–0.5)
EOSINOPHIL NFR BLD AUTO: 4.3 % — SIGNIFICANT CHANGE UP (ref 0–6)
HCT VFR BLD CALC: 36.5 % — SIGNIFICANT CHANGE UP (ref 34.5–45)
HGB BLD-MCNC: 12.5 G/DL — SIGNIFICANT CHANGE UP (ref 11.5–15.5)
IMM GRANULOCYTES # BLD AUTO: 0.08 K/UL — HIGH (ref 0–0.07)
IMM GRANULOCYTES NFR BLD AUTO: 0.8 % — SIGNIFICANT CHANGE UP (ref 0–0.9)
LYMPHOCYTES # BLD AUTO: 2.1 K/UL — SIGNIFICANT CHANGE UP (ref 1–3.3)
LYMPHOCYTES NFR BLD AUTO: 20 % — SIGNIFICANT CHANGE UP (ref 13–44)
MCHC RBC-ENTMCNC: 32.5 PG — SIGNIFICANT CHANGE UP (ref 27–34)
MCHC RBC-ENTMCNC: 34.2 G/DL — SIGNIFICANT CHANGE UP (ref 32–36)
MCV RBC AUTO: 94.8 FL — SIGNIFICANT CHANGE UP (ref 80–100)
MONOCYTES # BLD AUTO: 0.69 K/UL — SIGNIFICANT CHANGE UP (ref 0–0.9)
MONOCYTES NFR BLD AUTO: 6.6 % — SIGNIFICANT CHANGE UP (ref 2–14)
NEUTROPHILS # BLD AUTO: 7.14 K/UL — SIGNIFICANT CHANGE UP (ref 1.8–7.4)
NEUTROPHILS NFR BLD AUTO: 67.9 % — SIGNIFICANT CHANGE UP (ref 43–77)
NRBC # BLD AUTO: 0 K/UL — SIGNIFICANT CHANGE UP (ref 0–0)
NRBC # FLD: 0 K/UL — SIGNIFICANT CHANGE UP (ref 0–0)
NRBC BLD AUTO-RTO: 0 /100 WBCS — SIGNIFICANT CHANGE UP (ref 0–0)
PLATELET # BLD AUTO: 183 K/UL — SIGNIFICANT CHANGE UP (ref 150–400)
PMV BLD: 9.9 FL — SIGNIFICANT CHANGE UP (ref 7–13)
RBC # BLD: 3.85 M/UL — SIGNIFICANT CHANGE UP (ref 3.8–5.2)
RBC # FLD: 12.9 % — SIGNIFICANT CHANGE UP (ref 10.3–14.5)
T PALLIDUM AB TITR SER: NEGATIVE — SIGNIFICANT CHANGE UP
WBC # BLD: 10.5 K/UL — SIGNIFICANT CHANGE UP (ref 3.8–10.5)
WBC # FLD AUTO: 10.5 K/UL — SIGNIFICANT CHANGE UP (ref 3.8–10.5)

## 2025-02-14 PROCEDURE — 88307 TISSUE EXAM BY PATHOLOGIST: CPT

## 2025-02-14 PROCEDURE — 85027 COMPLETE CBC AUTOMATED: CPT

## 2025-02-14 PROCEDURE — 80053 COMPREHEN METABOLIC PANEL: CPT

## 2025-02-14 PROCEDURE — 86900 BLOOD TYPING SEROLOGIC ABO: CPT

## 2025-02-14 PROCEDURE — 86901 BLOOD TYPING SEROLOGIC RH(D): CPT

## 2025-02-14 PROCEDURE — 86850 RBC ANTIBODY SCREEN: CPT

## 2025-02-14 PROCEDURE — 87075 CULTR BACTERIA EXCEPT BLOOD: CPT

## 2025-02-14 PROCEDURE — 87077 CULTURE AEROBIC IDENTIFY: CPT

## 2025-02-14 PROCEDURE — 88307 TISSUE EXAM BY PATHOLOGIST: CPT | Mod: 26

## 2025-02-14 PROCEDURE — 87070 CULTURE OTHR SPECIMN AEROBIC: CPT

## 2025-02-14 PROCEDURE — 86923 COMPATIBILITY TEST ELECTRIC: CPT

## 2025-02-14 PROCEDURE — C1889: CPT

## 2025-02-14 PROCEDURE — 94760 N-INVAS EAR/PLS OXIMETRY 1: CPT

## 2025-02-14 PROCEDURE — 36415 COLL VENOUS BLD VENIPUNCTURE: CPT

## 2025-02-14 PROCEDURE — 99214 OFFICE O/P EST MOD 30 MIN: CPT

## 2025-02-14 PROCEDURE — 59050 FETAL MONITOR W/REPORT: CPT

## 2025-02-14 PROCEDURE — 85025 COMPLETE CBC W/AUTO DIFF WBC: CPT

## 2025-02-14 PROCEDURE — 87186 SC STD MICRODIL/AGAR DIL: CPT

## 2025-02-14 PROCEDURE — 86780 TREPONEMA PALLIDUM: CPT

## 2025-02-14 RX ORDER — PRENATAL 136/IRON/FOLIC ACID 27 MG-1 MG
1 TABLET ORAL DAILY
Refills: 0 | Status: DISCONTINUED | OUTPATIENT
Start: 2025-02-14 | End: 2025-02-16

## 2025-02-14 RX ORDER — AMPICILLIN SODIUM 1 G/1
1 INJECTION, POWDER, FOR SOLUTION INTRAMUSCULAR; INTRAVENOUS EVERY 4 HOURS
Refills: 0 | Status: DISCONTINUED | OUTPATIENT
Start: 2025-02-14 | End: 2025-02-14

## 2025-02-14 RX ORDER — MODIFIED LANOLIN 100 %
1 CREAM (GRAM) TOPICAL EVERY 6 HOURS
Refills: 0 | Status: DISCONTINUED | OUTPATIENT
Start: 2025-02-14 | End: 2025-02-16

## 2025-02-14 RX ORDER — OXYTOCIN-SODIUM CHLORIDE 0.9% IV SOLN 30 UNIT/500ML 30-0.9/5 UT/ML-%
167 SOLUTION INTRAVENOUS
Qty: 30 | Refills: 0 | Status: DISCONTINUED | OUTPATIENT
Start: 2025-02-14 | End: 2025-02-16

## 2025-02-14 RX ORDER — DIPHENHYDRAMINE HCL 12.5MG/5ML
25 ELIXIR ORAL EVERY 6 HOURS
Refills: 0 | Status: DISCONTINUED | OUTPATIENT
Start: 2025-02-14 | End: 2025-02-16

## 2025-02-14 RX ORDER — CITRIC ACID/SODIUM CITRATE 300-500 MG
30 SOLUTION, ORAL ORAL ONCE
Refills: 0 | Status: DISCONTINUED | OUTPATIENT
Start: 2025-02-14 | End: 2025-02-15

## 2025-02-14 RX ORDER — WITCH HAZEL LEAF
1 FLUID EXTRACT MISCELLANEOUS EVERY 4 HOURS
Refills: 0 | Status: DISCONTINUED | OUTPATIENT
Start: 2025-02-14 | End: 2025-02-16

## 2025-02-14 RX ORDER — MAGNESIUM HYDROXIDE 400 MG/5ML
30 SUSPENSION ORAL
Refills: 0 | Status: DISCONTINUED | OUTPATIENT
Start: 2025-02-14 | End: 2025-02-16

## 2025-02-14 RX ORDER — ACETAMINOPHEN 500 MG/5ML
975 LIQUID (ML) ORAL
Refills: 0 | Status: DISCONTINUED | OUTPATIENT
Start: 2025-02-14 | End: 2025-02-16

## 2025-02-14 RX ORDER — GENTAMICIN SULFATE 40 MG/ML
400 VIAL (ML) INJECTION ONCE
Refills: 0 | Status: COMPLETED | OUTPATIENT
Start: 2025-02-14 | End: 2025-02-14

## 2025-02-14 RX ORDER — OXYTOCIN-SODIUM CHLORIDE 0.9% IV SOLN 30 UNIT/500ML 30-0.9/5 UT/ML-%
167 SOLUTION INTRAVENOUS
Qty: 30 | Refills: 0 | Status: COMPLETED | OUTPATIENT
Start: 2025-02-14 | End: 2025-02-14

## 2025-02-14 RX ORDER — BENZOCAINE 220 MG/G
1 SPRAY, METERED PERIODONTAL EVERY 6 HOURS
Refills: 0 | Status: DISCONTINUED | OUTPATIENT
Start: 2025-02-14 | End: 2025-02-16

## 2025-02-14 RX ORDER — CLOSTRIDIUM TETANI TOXOID ANTIGEN (FORMALDEHYDE INACTIVATED), CORYNEBACTERIUM DIPHTHERIAE TOXOID ANTIGEN (FORMALDEHYDE INACTIVATED), BORDETELLA PERTUSSIS TOXOID ANTIGEN (GLUTARALDEHYDE INACTIVATED), BORDETELLA PERTUSSIS FILAMENTOUS HEMAGGLUTININ ANTIGEN (FORMALDEHYDE INACTIVATED), BORDETELLA PERTUSSIS PERTACTIN ANTIGEN, AND BORDETELLA PERTUSSIS FIMBRIAE 2/3 ANTIGEN 5; 2; 2.5; 5; 3; 5 [LF]/.5ML; [LF]/.5ML; UG/.5ML; UG/.5ML; UG/.5ML; UG/.5ML
0.5 INJECTION, SUSPENSION INTRAMUSCULAR ONCE
Refills: 0 | Status: DISCONTINUED | OUTPATIENT
Start: 2025-02-14 | End: 2025-02-16

## 2025-02-14 RX ORDER — KETOROLAC TROMETHAMINE 30 MG/ML
30 INJECTION, SOLUTION INTRAMUSCULAR; INTRAVENOUS ONCE
Refills: 0 | Status: DISCONTINUED | OUTPATIENT
Start: 2025-02-14 | End: 2025-02-15

## 2025-02-14 RX ORDER — IBUPROFEN 200 MG
600 TABLET ORAL EVERY 6 HOURS
Refills: 0 | Status: COMPLETED | OUTPATIENT
Start: 2025-02-14 | End: 2026-01-13

## 2025-02-14 RX ORDER — DIBUCAINE 10 MG/G
1 OINTMENT TOPICAL EVERY 6 HOURS
Refills: 0 | Status: DISCONTINUED | OUTPATIENT
Start: 2025-02-14 | End: 2025-02-16

## 2025-02-14 RX ORDER — PRAMOXINE HCL 1 %
1 GEL (GRAM) TOPICAL EVERY 4 HOURS
Refills: 0 | Status: DISCONTINUED | OUTPATIENT
Start: 2025-02-14 | End: 2025-02-16

## 2025-02-14 RX ORDER — AMPICILLIN SODIUM 1 G/1
2 INJECTION, POWDER, FOR SOLUTION INTRAMUSCULAR; INTRAVENOUS EVERY 6 HOURS
Refills: 0 | Status: DISCONTINUED | OUTPATIENT
Start: 2025-02-14 | End: 2025-02-15

## 2025-02-14 RX ORDER — OXYTOCIN-SODIUM CHLORIDE 0.9% IV SOLN 30 UNIT/500ML 30-0.9/5 UT/ML-%
SOLUTION INTRAVENOUS
Qty: 30 | Refills: 0 | Status: DISCONTINUED | OUTPATIENT
Start: 2025-02-14 | End: 2025-02-15

## 2025-02-14 RX ORDER — SODIUM CHLORIDE 9 G/1000ML
1000 INJECTION, SOLUTION INTRAVENOUS
Refills: 0 | Status: DISCONTINUED | OUTPATIENT
Start: 2025-02-14 | End: 2025-02-14

## 2025-02-14 RX ORDER — SODIUM CHLORIDE 9 G/1000ML
1000 INJECTION, SOLUTION INTRAVENOUS
Refills: 0 | Status: DISCONTINUED | OUTPATIENT
Start: 2025-02-14 | End: 2025-02-16

## 2025-02-14 RX ORDER — OXYCODONE HYDROCHLORIDE 30 MG/1
5 TABLET ORAL ONCE
Refills: 0 | Status: DISCONTINUED | OUTPATIENT
Start: 2025-02-14 | End: 2025-02-16

## 2025-02-14 RX ORDER — SIMETHICONE 80 MG
80 TABLET,CHEWABLE ORAL EVERY 4 HOURS
Refills: 0 | Status: DISCONTINUED | OUTPATIENT
Start: 2025-02-14 | End: 2025-02-16

## 2025-02-14 RX ORDER — ACETAMINOPHEN 500 MG/5ML
1000 LIQUID (ML) ORAL ONCE
Refills: 0 | Status: COMPLETED | OUTPATIENT
Start: 2025-02-14 | End: 2025-02-14

## 2025-02-14 RX ORDER — AMPICILLIN SODIUM 1 G/1
2 INJECTION, POWDER, FOR SOLUTION INTRAMUSCULAR; INTRAVENOUS ONCE
Refills: 0 | Status: COMPLETED | OUTPATIENT
Start: 2025-02-14 | End: 2025-02-14

## 2025-02-14 RX ORDER — SODIUM CHLORIDE 9 G/1000ML
1000 INJECTION, SOLUTION INTRAVENOUS ONCE
Refills: 0 | Status: COMPLETED | OUTPATIENT
Start: 2025-02-14 | End: 2025-02-14

## 2025-02-14 RX ORDER — OXYCODONE HYDROCHLORIDE 30 MG/1
5 TABLET ORAL
Refills: 0 | Status: DISCONTINUED | OUTPATIENT
Start: 2025-02-14 | End: 2025-02-16

## 2025-02-14 RX ORDER — SODIUM CHLORIDE 9 G/1000ML
500 INJECTION, SOLUTION INTRAVENOUS ONCE
Refills: 0 | Status: COMPLETED | OUTPATIENT
Start: 2025-02-14 | End: 2025-02-14

## 2025-02-14 RX ORDER — HYDROCORTISONE 10 MG/G
1 CREAM TOPICAL EVERY 6 HOURS
Refills: 0 | Status: DISCONTINUED | OUTPATIENT
Start: 2025-02-14 | End: 2025-02-16

## 2025-02-14 RX ADMIN — Medication 110 MILLIGRAM(S): at 22:16

## 2025-02-14 RX ADMIN — Medication 400 MILLIGRAM(S): at 21:36

## 2025-02-14 RX ADMIN — AMPICILLIN SODIUM 108 GRAM(S): 1 INJECTION, POWDER, FOR SOLUTION INTRAMUSCULAR; INTRAVENOUS at 18:55

## 2025-02-14 RX ADMIN — SODIUM CHLORIDE 125 MILLILITER(S): 9 INJECTION, SOLUTION INTRAVENOUS at 08:20

## 2025-02-14 RX ADMIN — SODIUM CHLORIDE 500 MILLILITER(S): 9 INJECTION, SOLUTION INTRAVENOUS at 20:01

## 2025-02-14 RX ADMIN — Medication 1000 MILLIGRAM(S): at 07:36

## 2025-02-14 RX ADMIN — SODIUM CHLORIDE 125 MILLILITER(S): 9 INJECTION, SOLUTION INTRAVENOUS at 06:29

## 2025-02-14 RX ADMIN — AMPICILLIN SODIUM 216 GRAM(S): 1 INJECTION, POWDER, FOR SOLUTION INTRAMUSCULAR; INTRAVENOUS at 06:28

## 2025-02-14 RX ADMIN — AMPICILLIN SODIUM 108 GRAM(S): 1 INJECTION, POWDER, FOR SOLUTION INTRAMUSCULAR; INTRAVENOUS at 15:06

## 2025-02-14 RX ADMIN — Medication 400 MILLIGRAM(S): at 06:28

## 2025-02-14 RX ADMIN — OXYTOCIN-SODIUM CHLORIDE 0.9% IV SOLN 30 UNIT/500ML 167 MILLIUNIT(S)/MIN: 30-0.9/5 SOLUTION at 22:30

## 2025-02-14 RX ADMIN — AMPICILLIN SODIUM 116 GRAM(S): 1 INJECTION, POWDER, FOR SOLUTION INTRAMUSCULAR; INTRAVENOUS at 23:03

## 2025-02-14 RX ADMIN — SODIUM CHLORIDE 125 MILLILITER(S): 9 INJECTION, SOLUTION INTRAVENOUS at 12:30

## 2025-02-14 RX ADMIN — Medication 1000 MILLIGRAM(S): at 22:23

## 2025-02-14 RX ADMIN — SODIUM CHLORIDE 1000 MILLILITER(S): 9 INJECTION, SOLUTION INTRAVENOUS at 02:50

## 2025-02-14 RX ADMIN — AMPICILLIN SODIUM 108 GRAM(S): 1 INJECTION, POWDER, FOR SOLUTION INTRAMUSCULAR; INTRAVENOUS at 11:10

## 2025-02-14 NOTE — OB PROVIDER H&P - HISTORY OF PRESENT ILLNESS
CAMDEN PALACIOS is a 34y  at 38w2d GA who presents to L&D for contractions that began 3 hours ago at midnight. She reports her contractions are every 4-6 minutes apart. She denies leakage of fluid or vaginal bleeding. She reports good fetal movement.   Patient is a prior c/s x1 (2023) for nrFHT at 4cm at [facility]. She desires TOLAC.       Pt denies trauma.  Pt denies headaches, visual disturbances, RUQ pain, epigastric pain and new-onset edema.   She denies any urinary complaints.   She denies fevers, chills, nausea, vomiting.   She denies shortness of breath, chest pain, and palpitations.    Pregnancy course is significant for:  -pCS () at 38w2d GA for nrFHT, was IOL at 38w for fetal CDH and dextrocardia, delivery [facility]  -[place] A-A  -AMA    POB: , pCS 2023 38w for nrFHT 4cm dilated  PGYN: -fibroids/-cysts, denied STD hx, denies abnormal PAPs  PMH: denies  PSH: c/s x1  SH: Denies tobacco use, EtOH use and illicit drug use during the pregnancy; Feels safe at home  Meds: PNV  All: NKDA

## 2025-02-14 NOTE — OB PROVIDER LABOR PROGRESS NOTE - ASSESSMENT
A/P: term TOLAC  slight progression / early labor noted.  Epid top off requested.  Cont to monitor.    PLEE

## 2025-02-14 NOTE — OB PROVIDER H&P - NS_SPONTANEOUSVAG_OBGYN_ALL_OB_NU
Medication/Dose: adderall 30 mg  Patient last seen by PCP: 2/1/21  Next office visit with PCP: 5/3/21  Last Lab:2/1/21    Above information requires contacting patient: No    PDMP needs to be reviewed by Provider    Sent to provider to approve or deny      
0

## 2025-02-14 NOTE — OB PROVIDER LABOR PROGRESS NOTE - ASSESSMENT
A/P: term desires TOLAC  disc that I would advise epidural in case of an emergent situation needed stat c/section. Having epidural in place would facilitate delivery and prevent GA effects on .  Pt will think about it and let us know.    PLEE

## 2025-02-14 NOTE — OB PROVIDER DELIVERY SUMMARY - NSPROVIDERDELIVERYNOTE_OBGYN_ALL_OB_FT
, now , who presented for labor in the setting of TOLAC at 38w3d GA. Intrapartum course significant for chorioamnionitis currently receiving indicated antibiotics treatment.   Patient felt rectal pressure and was found to be fully dilated, +1 station. She pushed effectively for 10 minutes, and delivered a viable male infant at 2230. Vertex delivered without difficulty in EDU position. No nuchal cord noted. Shoulders then delivered without difficulty.  successful! Delayed cord clamping was performed for approximately 30 seconds, then the cord was clamped, cut, and the infant was handed off to the awaiting neonatology team. Cord segment was clamped and cut for cord blood collection. Placenta delivered spontaneously and intact at 2234. Pitocin started. Uterus, cervix, perineum and vagina were inspected and a 1st degree vaginal laceration was noted and repaired with 2-0 vicryl suture. Excellent hemostasis was achieved. Internal exam revealed an intact lower uterine segment. Fundus firm to palpation. Apgars 9&9. EBL 75cc. , now , who presented for labor in the setting of TOLAC at 38w3d GA. Intrapartum course significant for chorioamnionitis currently receiving indicated antibiotics treatment.   Patient felt rectal pressure and was found to be fully dilated, +1 station. She pushed effectively for 10 minutes, and delivered a viable male infant at 2230. Vertex delivered without difficulty in EDU position. No nuchal cord noted. Shoulders then delivered without difficulty.  successful! Delayed cord clamping was performed for approximately 30 seconds, then the cord was clamped, cut, and the infant was handed off to the awaiting neonatology team. Cord segment was clamped and cut for cord blood collection. Placenta delivered spontaneously and intact at 2234. Pitocin started. Uterus, cervix, perineum and vagina were inspected and a 1st degree vaginal laceration was noted and repaired with 2-0 vicryl suture. Excellent hemostasis was achieved. Internal exam revealed an intact lower uterine segment. Fundus firm to palpation. Apgars 9&9. EBL 75cc.      OB Attending addendum   Agree with above. Gent given predelivery, ampilin 2g given immediately postpartum as patient progressed rapidly after diagnosis of chorio.     Jung Dolan DO

## 2025-02-14 NOTE — OB RN DELIVERY SUMMARY - AS DELIV COMPLICATIONS OB
abnormal fetal heart rate tracing/chorioamnionitis abnormal fetal heart rate tracing/chorioamnionitis/maternal fever abnormal fetal heart rate tracing/chorioamnionitis/maternal fever/other

## 2025-02-14 NOTE — OB PROVIDER LABOR PROGRESS NOTE - ASSESSMENT
OB Labor progress note  Pt in pain with contractions now requesting epidural  VSS in CPN  SVE: 3/70/-2  EFM: 150bpm baseline, moderate variability, + accels, no decels  toco: q5-6min    Plan to get epidural, wait til antibx complete then AROM, maternal VSS, fetal status reassuring cat 1

## 2025-02-14 NOTE — OB PROVIDER TRIAGE NOTE - HISTORY OF PRESENT ILLNESS
CAMDEN PALACIOS is a 34y  at 38w2d GA who presents to L&D for contractions that began 3 hours ago at midnight. She reports her contractions are every 4-6 minutes apart. She denies leakage of fluid or vaginal bleeding. She reports good fetal movement.   Patient is a prior c/s x1 (2023) for nrFHT at 4cm at [facility]. She desires TOLAC.       Pt denies trauma.  Pt denies headaches, visual disturbances, RUQ pain, epigastric pain and new-onset edema.   She denies any urinary complaints.   She denies fevers, chills, nausea, vomiting.   She denies shortness of breath, chest pain, and palpitations.    Pregnancy course is significant for:  -pCS () at 38w2d GA for nrFHT, was IOL at 38w for fetal CDH and dextrocardia, delivery @[facility]  -[place] A-A  -AMA    POB: , pCS 2023 38w for nrFHT 4cm dilated  PGYN: -fibroids/-cysts, denied STD hx, denies abnormal PAPs  PMH: denies  PSH: c/s x1  SH: Denies tobacco use, EtOH use and illicit drug use during the pregnancy; Feels safe at home  Meds: PNV  All: NKDA

## 2025-02-14 NOTE — OB PROVIDER LABOR PROGRESS NOTE - NS_OBIHIFHRDETAILS_OBGYN_ALL_OB_FT
150s mod [place] + accels, no decels
150s mod [place] + accels, no decels
165bpm +accels, mod [place], variable decels mixed late decels
160s mod [place] + accels, late decels improved with positioning
160bpm +accels, intermittent variable decels
155bpm +accels, intermittent late decels, mod [place]

## 2025-02-14 NOTE — OB PROVIDER LABOR PROGRESS NOTE - NS_SUBJECTIVE/OBJECTIVE_OBGYN_ALL_OB_FT
Resident feeling constant pressure
MD 1    pt uncomfortable with contractions
MD 1 On service note    H and P and labs reviewed.  EFW: 3500  Pt uncomfortable with contractions  Declines epidural presently
patient having deep variables on FHT
patient having more discomfort, intermittent late decels on FHT
Pt feeling a lot of pressure

## 2025-02-14 NOTE — OB PROVIDER H&P - ATTENDING COMMENTS
P1 38w2d admitted in labor, previous CS for NRFHT at 38 wks at [facility] in 2023, patient desires to TOLAC, R/B/A of TOLAC explained to patient in detail, including risk of uterine rupture and need for emergent CS under GA. Patient verbalized understanding and wishes to proceed with TOLAC. Patient encouraged to get early epidural, made aware of possibility of needing GA if we don't already have an epidural in place to bolus in case she needs a emergency . Patient verbalized understanding declined epidural at this time, wishes to think about it with  and will let us know if she changes her mind. Category I tracing, prasanth every 2 mins.

## 2025-02-14 NOTE — OB PROVIDER H&P - ASSESSMENT
Patient is a 35 yo  at 38w2d GA admitted to L&D for labor, patient desires TOLAC. Cat I FHT.   - FHT reactive, reassuring  - [place]: prasanth q2-3 minutes, contractions painful  - Cervical change made from 0.5/30/-3 to 1.5/50/-3 in 2 hours despite IVF boluses and maintenance   - consents  - admission labs  - IV hydration  - continuous toco and fetal heart monitoring  - GBS positive, start ampicillin   - expectant management, no augmentation given patient is 38w2d  - pain management: epidural prn    Discussed with Dr. Nugyen    Patient is a 35 yo  at 38w2d GA admitted to L&D for labor, patient desires TOLAC. Cat I FHT.   - FHT reactive, reassuring  - [place]: prasanth q2-3 minutes, contractions painful  - Cervical change made from 0.5/30/-3 to 1.5/50/-3 in 2 hours despite IVF boluses and maintenance   - consents  - admission labs  - IV hydration  - continuous toco and fetal heart monitoring  - GBS positive, start ampicillin   - expectant management, no augmentation given patient is 38w2d  - pain management: Epidural prn. Patient declines epidural at this time. Benefits of early epidural placement in setting of TOLAC discussed. Patient understands. She wants an epidural eventually, just not right at this moment.     Discussed with Dr. Nguyen

## 2025-02-14 NOTE — OB PROVIDER TRIAGE NOTE - ATTENDING COMMENTS
P1 38w2d here for labor check, made cervical change after 2 hours to be admitted for labor. GBS positive placed on Ampicillin.

## 2025-02-14 NOTE — OB PROVIDER TRIAGE NOTE - NSOBPROVIDERNOTE_OBGYN_ALL_OB_FT
A/P: 33 yo  at 38w2d GA who presents to L&D triage for contractions. Rule-out labor. Patient desires TOLAC.   -FHT: reactive, reassuring  -[place]: q3-6 minutes  -Cervical exam 0.5cm, long and firm  -IVF bolus followed by IVF maintenance LR   -Given pt's GA 38w2d, not indicated for augmentation  -Will recheck cervix in 2-3 hours after IVF bolus to check for cervical change  -Pt desires TOLAC. Risks and benefits discussed with patient. She vocalizes understanding of the risks.    Dispo: continue to observe    D/w Dr. Nguyen

## 2025-02-14 NOTE — OB PROVIDER H&P - NSSCHADMISSION_OBGYN_A_OB
No What Type Of Note Output Would You Prefer (Optional)?: Bullet Format How Severe Is Your Skin Lesion?: moderate Has Your Skin Lesion Been Treated?: not been treated Is This A New Presentation, Or A Follow-Up?: Skin Lesions

## 2025-02-14 NOTE — OB PROVIDER LABOR PROGRESS NOTE - ASSESSMENT
-Intermittent Cat II FHT  -IVF bolus followed by maintenance 250/hr   -Position changes, peanut ball  -Patient making cervical change on her own  -Requesting top off for epidural from anesthesia  -Continue expectant management

## 2025-02-14 NOTE — OB RN DELIVERY SUMMARY - NS_SEPSISRSKCALC_OBGYN_ALL_OB_FT
EOS calculated successfully. EOS Risk Factor: 0.5/1000 live births ([facility] national incidence); GA=38w3d; Temp=100.7; ROM=10.333; GBS='Positive'; Antibiotics='GBS specific antibiotics > 2 hrs prior to birth'

## 2025-02-14 NOTE — OB PROVIDER H&P - NSLOWPPHRISK_OBGYN_A_OB
Chris Pregnancy/Less than or equal to 4 previous vaginal births/No known bleeding disorder/No history of postpartum hemorrhage

## 2025-02-14 NOTE — OB PROVIDER H&P - NSHPPHYSICALEXAM_GEN_ALL_CORE
Vital Signs  T(C): 36.6 (14 Feb 2025 03:08), Max: 36.6 (14 Feb 2025 03:08)  T(F): 97.9 (14 Feb 2025 03:08), Max: 97.9 (14 Feb 2025 03:08)  HR: 59 (14 Feb 2025 03:08) (59 - 59)  BP: 124/78 (14 Feb 2025 03:08) (124/78 - 124/78)  RR: 18 (14 Feb 2025 03:08) (18 - 18)    GEN: AAOx3  ABD: soft, nontender, gravid  EXTREM: no edema, erythema, or tenderness  SVE: 0.5/30/-3 @0230 >> 1.5/50/-3 @0500    FHT: 125bpm +accels no decels mod [place]  Micro: q2-3 mins    Sono: vertex

## 2025-02-14 NOTE — OB PROVIDER LABOR PROGRESS NOTE - ASSESSMENT
-Cat II FHT  -Mixed variable decels and late decels -> patient was repositioned to right lateral decubitus > decels resolved.  -FHR now 160sbpm baseline, pt rectal temp 100.7  -Suspected chorioamnionitis, start amp/gent abx tx, give ofirmev  -Will continue to closely monitor FHT and vitals -Cat II FHT  -Mixed variable decels and late decels -> patient was repositioned to right lateral decubitus > decels resolved.  -FHR now 160sbpm baseline, pt rectal temp 100.7  -Suspected chorioamnionitis, start amp/gent abx tx, give ofirmev  -Will continue to closely monitor FHT and vitals      Attending addendum    Patient discussed with resident. Agree with assessment. TOLAC with cat 2 tracing. Overall reassuring for moderate variability making cervical change. Plan to continue current expectant management.     Discussed with patient if tracing worsens or fails to make progress would consider a repeat CS. Patient highly desires tolac.    Jung Bess

## 2025-02-14 NOTE — OB PROVIDER TRIAGE NOTE - NSHPPHYSICALEXAM_GEN_ALL_CORE
VITALS  T(C): --  HR: --  BP: --  RR: --  SpO2: --    Gen: NAD, well-appearing  Heart: S1 S2, RRR  Lungs: CTAB  Abd: soft, gravid  Ext: non-edematous, non-tender   SVE:  0.5/30/-3    FHT:  140bpm + accels no decels mod [place]  [place]: q3-6 mins  Sono: vertex

## 2025-02-14 NOTE — OB PROVIDER LABOR PROGRESS NOTE - ASSESSMENT
OB Labor progress note  Pt resting comfortably s/p epidural  VSS in CPN, hypotension following epidural placement now normotensive  SVE: 4/80/-2 AROM clr @ 1220  EFM: 150bpm baseline, moderate variability, + accels, no decels  toco: q2min    Plan for expectant management, fetal status reassuring now cat 1, maternal VSS    Dr. TALHA Garcia MD1 aware

## 2025-02-15 ENCOUNTER — TRANSCRIPTION ENCOUNTER (OUTPATIENT)
Age: 35
End: 2025-02-15

## 2025-02-15 LAB
ALBUMIN SERPL ELPH-MCNC: 2.1 G/DL — LOW (ref 3.3–5)
ALP SERPL-CCNC: 114 U/L — SIGNIFICANT CHANGE UP (ref 40–120)
ALT FLD-CCNC: 19 U/L — SIGNIFICANT CHANGE UP (ref 12–78)
ANION GAP SERPL CALC-SCNC: 10 MMOL/L — SIGNIFICANT CHANGE UP (ref 5–17)
AST SERPL-CCNC: 20 U/L — SIGNIFICANT CHANGE UP (ref 15–37)
BILIRUB SERPL-MCNC: 0.5 MG/DL — SIGNIFICANT CHANGE UP (ref 0.2–1.2)
BUN SERPL-MCNC: 7 MG/DL — SIGNIFICANT CHANGE UP (ref 7–23)
CALCIUM SERPL-MCNC: 7.8 MG/DL — LOW (ref 8.5–10.1)
CHLORIDE SERPL-SCNC: 110 MMOL/L — HIGH (ref 96–108)
CO2 SERPL-SCNC: 18 MMOL/L — LOW (ref 22–31)
CREAT SERPL-MCNC: 0.64 MG/DL — SIGNIFICANT CHANGE UP (ref 0.5–1.3)
EGFR: 118 ML/MIN/1.73M2 — SIGNIFICANT CHANGE UP
GLUCOSE SERPL-MCNC: 111 MG/DL — HIGH (ref 70–99)
HCT VFR BLD CALC: 30.3 % — LOW (ref 34.5–45)
HCT VFR BLD CALC: 32.6 % — LOW (ref 34.5–45)
HGB BLD-MCNC: 10.5 G/DL — LOW (ref 11.5–15.5)
HGB BLD-MCNC: 11.5 G/DL — SIGNIFICANT CHANGE UP (ref 11.5–15.5)
MCHC RBC-ENTMCNC: 32.8 PG — SIGNIFICANT CHANGE UP (ref 27–34)
MCHC RBC-ENTMCNC: 33 PG — SIGNIFICANT CHANGE UP (ref 27–34)
MCHC RBC-ENTMCNC: 34.7 G/DL — SIGNIFICANT CHANGE UP (ref 32–36)
MCHC RBC-ENTMCNC: 35.3 G/DL — SIGNIFICANT CHANGE UP (ref 32–36)
MCV RBC AUTO: 93.7 FL — SIGNIFICANT CHANGE UP (ref 80–100)
MCV RBC AUTO: 94.7 FL — SIGNIFICANT CHANGE UP (ref 80–100)
NRBC # BLD AUTO: 0 K/UL — SIGNIFICANT CHANGE UP (ref 0–0)
NRBC # BLD AUTO: 0 K/UL — SIGNIFICANT CHANGE UP (ref 0–0)
NRBC # FLD: 0 K/UL — SIGNIFICANT CHANGE UP (ref 0–0)
NRBC # FLD: 0 K/UL — SIGNIFICANT CHANGE UP (ref 0–0)
NRBC BLD AUTO-RTO: 0 /100 WBCS — SIGNIFICANT CHANGE UP (ref 0–0)
NRBC BLD AUTO-RTO: 0 /100 WBCS — SIGNIFICANT CHANGE UP (ref 0–0)
PLATELET # BLD AUTO: 133 K/UL — LOW (ref 150–400)
PLATELET # BLD AUTO: 145 K/UL — LOW (ref 150–400)
PMV BLD: 9.6 FL — SIGNIFICANT CHANGE UP (ref 7–13)
PMV BLD: 9.6 FL — SIGNIFICANT CHANGE UP (ref 7–13)
POTASSIUM SERPL-MCNC: 3.5 MMOL/L — SIGNIFICANT CHANGE UP (ref 3.5–5.3)
POTASSIUM SERPL-SCNC: 3.5 MMOL/L — SIGNIFICANT CHANGE UP (ref 3.5–5.3)
PROT SERPL-MCNC: 5.1 GM/DL — LOW (ref 6–8.3)
RBC # BLD: 3.2 M/UL — LOW (ref 3.8–5.2)
RBC # BLD: 3.48 M/UL — LOW (ref 3.8–5.2)
RBC # FLD: 12.8 % — SIGNIFICANT CHANGE UP (ref 10.3–14.5)
RBC # FLD: 12.9 % — SIGNIFICANT CHANGE UP (ref 10.3–14.5)
SODIUM SERPL-SCNC: 138 MMOL/L — SIGNIFICANT CHANGE UP (ref 135–145)
WBC # BLD: 15.76 K/UL — HIGH (ref 3.8–10.5)
WBC # BLD: 20.48 K/UL — HIGH (ref 3.8–10.5)
WBC # FLD AUTO: 15.76 K/UL — HIGH (ref 3.8–10.5)
WBC # FLD AUTO: 20.48 K/UL — HIGH (ref 3.8–10.5)

## 2025-02-15 RX ORDER — IBUPROFEN 200 MG
600 TABLET ORAL EVERY 6 HOURS
Refills: 0 | Status: DISCONTINUED | OUTPATIENT
Start: 2025-02-15 | End: 2025-02-16

## 2025-02-15 RX ADMIN — KETOROLAC TROMETHAMINE 30 MILLIGRAM(S): 30 INJECTION, SOLUTION INTRAMUSCULAR; INTRAVENOUS at 00:16

## 2025-02-15 RX ADMIN — AMPICILLIN SODIUM 216 GRAM(S): 1 INJECTION, POWDER, FOR SOLUTION INTRAMUSCULAR; INTRAVENOUS at 12:43

## 2025-02-15 RX ADMIN — AMPICILLIN SODIUM 216 GRAM(S): 1 INJECTION, POWDER, FOR SOLUTION INTRAMUSCULAR; INTRAVENOUS at 06:23

## 2025-02-15 RX ADMIN — Medication 600 MILLIGRAM(S): at 06:50

## 2025-02-15 RX ADMIN — Medication 975 MILLIGRAM(S): at 13:02

## 2025-02-15 RX ADMIN — Medication 975 MILLIGRAM(S): at 22:00

## 2025-02-15 RX ADMIN — Medication 975 MILLIGRAM(S): at 21:37

## 2025-02-15 RX ADMIN — Medication 3 MILLILITER(S): at 06:25

## 2025-02-15 RX ADMIN — Medication 600 MILLIGRAM(S): at 18:11

## 2025-02-15 RX ADMIN — AMPICILLIN SODIUM 216 GRAM(S): 1 INJECTION, POWDER, FOR SOLUTION INTRAMUSCULAR; INTRAVENOUS at 18:11

## 2025-02-15 RX ADMIN — Medication 600 MILLIGRAM(S): at 18:42

## 2025-02-15 RX ADMIN — Medication 3 MILLILITER(S): at 22:39

## 2025-02-15 RX ADMIN — Medication 975 MILLIGRAM(S): at 02:59

## 2025-02-15 RX ADMIN — Medication 600 MILLIGRAM(S): at 13:02

## 2025-02-15 RX ADMIN — Medication 600 MILLIGRAM(S): at 06:22

## 2025-02-15 RX ADMIN — Medication 975 MILLIGRAM(S): at 10:02

## 2025-02-15 RX ADMIN — Medication 975 MILLIGRAM(S): at 16:21

## 2025-02-15 RX ADMIN — Medication 975 MILLIGRAM(S): at 16:02

## 2025-02-15 RX ADMIN — Medication 975 MILLIGRAM(S): at 03:25

## 2025-02-15 RX ADMIN — Medication 600 MILLIGRAM(S): at 12:43

## 2025-02-15 NOTE — PROGRESS NOTE ADULT - SUBJECTIVE AND OBJECTIVE BOX
CAMDEN PALACIOS is a 34y  now PPD#1 s/p  at 38 weeks gestation, course c/b chorio s/p amp and gent.    S:    No acute events overnight.   The patient has no complaints.  Pain controlled with current treatment regimen.   She is ambulating without difficulty and tolerating PO.   + flatus/-BM/+ voiding   She endorses appropriate lochia, which is decreasing.   She is breastfeeding without difficulty and bottlefeeding to supplement.  She denies fevers, chills, nausea and vomiting.   She denies lightheadedness, dizziness, palpitations, chest pain and SOB.     O:    T(C): 36.9 (02-15-25 @ 05:00), Max: 38.2 (25 @ 21:31)  HR: 48 (02-15-25 @ 05:00) (48 - 67)  BP: 114/60 (02-15-25 @ 05:00) (112/62 - 149/79)  RR: 16 (02-15-25 @ 05:00) (16 - 20)  SpO2: 97% (02-15-25 @ 05:00) (97% - 98%)    Gen: NAD, AOx3  CV: RRR, S1/S2 present  Pulm: CTAB  Breast: Nontender, non-engorged   Abdomen:  Soft, non-tender, non-distended, +bowel sounds  Uterus:  Fundus firm below umbilicus  VE:  Expectant lochia  Ext:  Non-tender and non-edematous                          11.5   20.48 )-----------( 145      ( 15 Feb 2025 00:48 )             32.6     02-15    138  |  110[H]  |  7   ----------------------------<  111[H]  3.5   |  18[L]  |  0.64    Ca    7.8[L]      15 Feb 2025 00:48    TPro  5.1[L]  /  Alb  2.1[L]  /  TBili  0.5  /  DBili  x   /  AST  20  /  ALT  19  /  AlkPhos  114  02-15      A/P:      34y  now PPD#1 s/p  at 38 weeks gestation, course c/b chorio s/p amp and gent.    #Routine postpartum care  -Vital signs stable  -Hgb: 12.5 > 11.5  -> AM labs pending   -Voiding, tolerating PO, bowel function nml   -Advance care as tolerated   -Continue routine postpartum care and education  -Healthy male infant, declines circumcision    #Chorio  - S/p gent, continue ampicillin  - monitor VS    -Dispo: Patient to be discharged when meeting all postpartum and postoperative milestones and pending attending approval.

## 2025-02-15 NOTE — DISCHARGE NOTE OB - HOSPITAL COURSE
34F  s/p  @38w2d on 25. Course c/b chorioamnionitis, received ofirmev, ampicillin and gentamicin. Patient transferred to post partum unit, uncomplicated hospital course. At the time of discharge patient was tolerating regular diet PO, ambulating, voiding, and demonstrating bowel function. Pain well controlled with pain medications PRN.

## 2025-02-15 NOTE — DISCHARGE NOTE OB - PATIENT PORTAL LINK FT
You can access the FollowMyHealth Patient Portal offered by [facility] by registering at the following website: [email or web address]. By joining [employer]’s FollowMyHealth portal, you will also be able to view your health information using other applications (apps) compatible with our system.

## 2025-02-15 NOTE — DISCHARGE NOTE OB - CARE PLAN
Principal Discharge DX:	, delivered  Assessment and plan of treatment:	1) Please take ibuprofen and tylenol as needed for pain.  2) Nothing in the vagina for 6 weeks (including no sex, no tampons, and no douching).  3) No tub baths or pools for 2 weeks. Showers are okay.  4) Please call your doctor for a follow up appointment  5) Please call the office sooner if you have heavy vaginal bleeding, severe abdominal pain, or fever over 100.4F.   1

## 2025-02-15 NOTE — DISCHARGE NOTE OB - MEDICATION SUMMARY - MEDICATIONS TO TAKE
I will START or STAY ON the medications listed below when I get home from the hospital:  None I will START or STAY ON the medications listed below when I get home from the hospital:    ibuprofen 600 mg oral tablet  -- 1 tab(s) by mouth every 6 hours  -- Indication: For pain    acetaminophen 325 mg oral tablet  -- 3 tab(s) by mouth every 6 hours  -- Indication: For pain    Prenatal Multivitamins with Folic Acid 1 mg oral tablet  -- 1 tab(s) by mouth once a day  -- Indication: For postpartum

## 2025-02-15 NOTE — DISCHARGE NOTE OB - FINANCIAL ASSISTANCE
[facility] provides services at a reduced cost to those who are determined to be eligible through [facility]’s financial assistance program. Information regarding [facility]’s financial assistance program can be found by going to [email or web address] or by calling [phone].

## 2025-02-15 NOTE — OB NEONATOLOGY/PEDIATRICIAN DELIVERY SUMMARY - NSPEDSNEONOTESA_OBGYN_ALL_OB_FT
Called to delivery for category 2 tracing. Meconium noted. Maternal fever prior to delivery. Baby cried upon delivery. Warmed, dried, stimulated and suctioned. Routine DR care. EOS 1.68- obtain blood culture. Obtain CBC in 6 hrs. Parents updated. Called to delivery for category 2 tracing. Meconium noted. Maternal fever prior to delivery. Baby cried upon delivery. Warmed, dried, stimulated and suctioned. Routine DR care. EOS <1. Parents updated.

## 2025-02-16 VITALS
HEART RATE: 57 BPM | DIASTOLIC BLOOD PRESSURE: 71 MMHG | RESPIRATION RATE: 16 BRPM | SYSTOLIC BLOOD PRESSURE: 108 MMHG | TEMPERATURE: 99 F | OXYGEN SATURATION: 99 %

## 2025-02-16 RX ORDER — IBUPROFEN 200 MG
1 TABLET ORAL
Qty: 0 | Refills: 0 | DISCHARGE
Start: 2025-02-16

## 2025-02-16 RX ORDER — ACETAMINOPHEN 500 MG/5ML
3 LIQUID (ML) ORAL
Qty: 0 | Refills: 0 | DISCHARGE
Start: 2025-02-16

## 2025-02-16 RX ORDER — PRENATAL 136/IRON/FOLIC ACID 27 MG-1 MG
1 TABLET ORAL
Qty: 0 | Refills: 0 | DISCHARGE
Start: 2025-02-16

## 2025-02-16 RX ADMIN — Medication 1 TABLET(S): at 12:03

## 2025-02-16 RX ADMIN — Medication 600 MILLIGRAM(S): at 12:03

## 2025-02-16 RX ADMIN — Medication 975 MILLIGRAM(S): at 03:55

## 2025-02-16 RX ADMIN — Medication 975 MILLIGRAM(S): at 14:51

## 2025-02-16 RX ADMIN — Medication 600 MILLIGRAM(S): at 06:20

## 2025-02-16 RX ADMIN — Medication 600 MILLIGRAM(S): at 00:47

## 2025-02-16 RX ADMIN — Medication 975 MILLIGRAM(S): at 03:27

## 2025-02-16 RX ADMIN — Medication 600 MILLIGRAM(S): at 00:18

## 2025-02-16 RX ADMIN — Medication 600 MILLIGRAM(S): at 06:50

## 2025-02-16 RX ADMIN — Medication 975 MILLIGRAM(S): at 09:45

## 2025-02-16 NOTE — PROGRESS NOTE ADULT - ASSESSMENT
A/P:  34y  now PPD#2 s/p  at 38 weeks gestation, course c/b chorio s/p amp and gent.    #Routine postpartum care  -Vital signs stable  -Hgb: 12.5 > 11.5  > 10.5  -WBC 10.5 > 20 > 15.7  -Voiding, tolerating PO, bowel function nml   -Advance care as tolerated   -Continue routine postpartum care and education  -Healthy male infant, declines circumcision    #Chorio  - S/p gent, ampicillin  - monitor VS    -Dispo: Patient to be discharged when meeting all postpartum and postoperative milestones and pending attending approval.   A/P:  34y  now PPD#2 s/p  at 38 weeks gestation, c/b chorio s/p amp and gent.    #Routine postpartum care  -Vital signs stable  -Hgb: 12.5 > 11.5  > 10.5  -WBC 10.5 > 20 > 15.7  -Voiding, tolerating PO, bowel function nml   -Advance care as tolerated   -Continue routine postpartum care and education  -Healthy male infant, declines circumcision    #Chorio  - S/p gent, ampicillin  - monitor VS    -Dispo: Patient to be discharged when meeting all postpartum and postoperative milestones and pending attending approval.   A/P:  34y  now PPD#2 s/p  at 38 weeks gestation, c/b chorio s/p amp and gent.    #Routine postpartum care  -Vital signs stable  -Hgb: 12.5 > 11.5  > 10.5  -WBC 10.5 > 20 > 15.7  -Voiding, tolerating PO, bowel function nml   -Advance care as tolerated   -Continue routine postpartum care and education  -Healthy male infant, declines circumcision  -Pt still unsure of which birth control method she prefers.     #Chorio  - S/p gent, ampicillin  - monitor VS    -Dispo: Patient to be discharged when meeting all postpartum and postoperative milestones and pending attending approval.

## 2025-02-16 NOTE — PROGRESS NOTE ADULT - SUBJECTIVE AND OBJECTIVE BOX
CAMDEN PALACIOS is a 34y  now PPD#2 s/p  at 38 weeks gestation, course c/b chorio s/p amp and gent.    S:    No acute events overnight.   The patient has no complaints.  Pain controlled with current treatment regimen.   She is ambulating without difficulty and tolerating PO.   + flatus/-BM/+ voiding   She endorses appropriate lochia, which is decreasing.   She is breastfeeding without difficulty and bottle feeding to supplement.  She denies fevers, chills, nausea and vomiting.   She denies lightheadedness, dizziness, palpitations, chest pain and SOB.     O:    T(C): 36.6 (2025 00:00), Max: 37.1 (15 Feb 2025 08:05)  T(F): 97.9 (2025 00:00), Max: 98.8 (15 Feb 2025 08:05)  HR: 60 (2025 00:00) (60 - 65)  BP: 118/63 (2025 00:00) (97/60 - 118/63)  RR: 16 (2025 00:00) (16 - 16)  SpO2: 99% (2025 00:00) (98% - 100%)    Gen: NAD, AOx3  CV: RRR, S1/S2 present  Pulm: CTAB  Breast: Nontender, non-engorged   Abdomen:  Soft, non-tender, non-distended, +bowel sounds  Uterus:  Fundus firm below umbilicus  VE:  Expectant lochia  Ext:  Non-tender and non-edematous                                     10.5   15.76 )-----------( 133      ( 15 Feb 2025 08:09 )             30.3     02-15    138  |  110[H]  |  7   ----------------------------<  111[H]  3.5   |  18[L]  |  0.64    Ca    7.8[L]      15 Feb 2025 00:48    TPro  5.1[L]  /  Alb  2.1[L]  /  TBili  0.5  /  DBili  x   /  AST  20  /  ALT  19  /  AlkPhos  114  -15         CAMDEN PALACIOS is a 34y  now PPD#2 s/p  at 38 weeks gestation, course c/b chorio s/p amp and gent.    S:    No acute events overnight.   The patient states that she has some residual right sided pain, has not been able to sleep well on the bed, but has no difficulty with ambulation, urination, or bowel movements.   Pain controlled with current treatment regimen.   She is ambulating without difficulty and tolerating PO.   +flatus/+BM/+ voiding   She endorses appropriate lochia, which is decreasing.   She is breastfeeding without difficulty and bottle feeding to supplement.  She denies fevers, chills, nausea and vomiting.   She denies lightheadedness, dizziness, palpitations, chest pain and SOB.     O:    T(C): 36.6 (2025 00:00), Max: 37.1 (15 Feb 2025 08:05)  T(F): 97.9 (2025 00:00), Max: 98.8 (15 Feb 2025 08:05)  HR: 60 (2025 00:00) (60 - 65)  BP: 118/63 (2025 00:00) (97/60 - 118/63)  RR: 16 (2025 00:00) (16 - 16)  SpO2: 99% (2025 00:00) (98% - 100%)    Gen: NAD, AOx3  CV: RRR, S1/S2 present  Pulm: CTAB  Breast: Nontender, non-engorged   Abdomen:  Soft, non-tender, non-distended, +bowel sounds  Uterus:  Fundus firm below umbilicus  VE:  Expectant lochia  Ext:  Non-tender and non-edematous                                     10.5   15.76 )-----------( 133      ( 15 Feb 2025 08:09 )             30.3     02-15    138  |  110[H]  |  7   ----------------------------<  111[H]  3.5   |  18[L]  |  0.64    Ca    7.8[L]      15 Feb 2025 00:48    TPro  5.1[L]  /  Alb  2.1[L]  /  TBili  0.5  /  DBili  x   /  AST  20  /  ALT  19  /  AlkPhos  114  02-15

## 2025-02-18 ENCOUNTER — APPOINTMENT (OUTPATIENT)
Dept: ANTEPARTUM | Facility: CLINIC | Age: 35
End: 2025-02-18

## 2025-02-19 LAB
-  AMPICILLIN: SIGNIFICANT CHANGE UP
-  VANCOMYCIN: SIGNIFICANT CHANGE UP
METHOD TYPE: SIGNIFICANT CHANGE UP

## 2025-02-20 LAB
CULTURE RESULTS: ABNORMAL
CULTURE RESULTS: ABNORMAL
ORGANISM # SPEC MICROSCOPIC CNT: ABNORMAL
ORGANISM # SPEC MICROSCOPIC CNT: SIGNIFICANT CHANGE UP
SPECIMEN SOURCE: SIGNIFICANT CHANGE UP
SPECIMEN SOURCE: SIGNIFICANT CHANGE UP

## 2025-02-21 DIAGNOSIS — O41.1230 CHORIOAMNIONITIS, THIRD TRIMESTER, NOT APPLICABLE OR UNSPECIFIED: ICD-10-CM

## 2025-02-21 DIAGNOSIS — O34.219 MATERNAL CARE FOR UNSPECIFIED TYPE SCAR FROM PREVIOUS CESAREAN DELIVERY: ICD-10-CM

## 2025-02-21 DIAGNOSIS — Z3A.38 38 WEEKS GESTATION OF PREGNANCY: ICD-10-CM

## 2025-02-25 ENCOUNTER — APPOINTMENT (OUTPATIENT)
Dept: ANTEPARTUM | Facility: CLINIC | Age: 35
End: 2025-02-25